# Patient Record
Sex: MALE | Race: WHITE | NOT HISPANIC OR LATINO | Employment: OTHER | URBAN - NONMETROPOLITAN AREA
[De-identification: names, ages, dates, MRNs, and addresses within clinical notes are randomized per-mention and may not be internally consistent; named-entity substitution may affect disease eponyms.]

---

## 2018-09-29 ENCOUNTER — HOSPITAL ENCOUNTER (OUTPATIENT)
Facility: HOSPITAL | Age: 58
Setting detail: OBSERVATION
Discharge: HOME | End: 2018-09-30
Attending: FAMILY MEDICINE | Admitting: EMERGENCY MEDICINE

## 2018-09-29 ENCOUNTER — APPOINTMENT (OUTPATIENT)
Dept: CT IMAGING | Facility: HOSPITAL | Age: 58
End: 2018-09-29

## 2018-09-29 ENCOUNTER — APPOINTMENT (OUTPATIENT)
Dept: GENERAL RADIOLOGY | Facility: HOSPITAL | Age: 58
End: 2018-09-29

## 2018-09-29 DIAGNOSIS — R07.9 CHEST PAIN, UNSPECIFIED TYPE: Primary | ICD-10-CM

## 2018-09-29 LAB
ALBUMIN SERPL-MCNC: 4 G/DL (ref 3.5–5)
ALBUMIN/GLOB SERPL: 1.2 G/DL (ref 1.1–2.5)
ALP SERPL-CCNC: 120 U/L (ref 24–120)
ALT SERPL W P-5'-P-CCNC: 35 U/L (ref 0–54)
ANION GAP SERPL CALCULATED.3IONS-SCNC: 9 MMOL/L (ref 4–13)
AST SERPL-CCNC: 23 U/L (ref 7–45)
BASOPHILS # BLD AUTO: 0.07 10*3/MM3 (ref 0–0.2)
BASOPHILS NFR BLD AUTO: 0.8 % (ref 0–2)
BILIRUB SERPL-MCNC: 0.4 MG/DL (ref 0.1–1)
BUN BLD-MCNC: 10 MG/DL (ref 5–21)
BUN/CREAT SERPL: 10.9 (ref 7–25)
CALCIUM SPEC-SCNC: 8.7 MG/DL (ref 8.4–10.4)
CHLORIDE SERPL-SCNC: 104 MMOL/L (ref 98–110)
CO2 SERPL-SCNC: 28 MMOL/L (ref 24–31)
CREAT BLD-MCNC: 0.92 MG/DL (ref 0.5–1.4)
D DIMER PPP FEU-MCNC: 0.35 MG/L (FEU) (ref 0–0.5)
DEPRECATED RDW RBC AUTO: 42.2 FL (ref 40–54)
EOSINOPHIL # BLD AUTO: 0.62 10*3/MM3 (ref 0–0.7)
EOSINOPHIL NFR BLD AUTO: 7.5 % (ref 0–4)
ERYTHROCYTE [DISTWIDTH] IN BLOOD BY AUTOMATED COUNT: 13.1 % (ref 12–15)
GFR SERPL CREATININE-BSD FRML MDRD: 84 ML/MIN/1.73
GLOBULIN UR ELPH-MCNC: 3.4 GM/DL
GLUCOSE BLD-MCNC: 113 MG/DL (ref 70–100)
HCT VFR BLD AUTO: 42.1 % (ref 40–52)
HGB BLD-MCNC: 14.7 G/DL (ref 14–18)
HOLD SPECIMEN: NORMAL
HOLD SPECIMEN: NORMAL
IMM GRANULOCYTES # BLD: 0.02 10*3/MM3 (ref 0–0.03)
IMM GRANULOCYTES NFR BLD: 0.2 % (ref 0–5)
INR PPP: 0.84 (ref 0.91–1.09)
LYMPHOCYTES # BLD AUTO: 2.27 10*3/MM3 (ref 0.72–4.86)
LYMPHOCYTES NFR BLD AUTO: 27.3 % (ref 15–45)
MCH RBC QN AUTO: 30.6 PG (ref 28–32)
MCHC RBC AUTO-ENTMCNC: 34.9 G/DL (ref 33–36)
MCV RBC AUTO: 87.7 FL (ref 82–95)
MONOCYTES # BLD AUTO: 0.67 10*3/MM3 (ref 0.19–1.3)
MONOCYTES NFR BLD AUTO: 8.1 % (ref 4–12)
NEUTROPHILS # BLD AUTO: 4.67 10*3/MM3 (ref 1.87–8.4)
NEUTROPHILS NFR BLD AUTO: 56.1 % (ref 39–78)
NRBC BLD MANUAL-RTO: 0 /100 WBC (ref 0–0)
PLATELET # BLD AUTO: 370 10*3/MM3 (ref 130–400)
PMV BLD AUTO: 10.2 FL (ref 6–12)
POTASSIUM BLD-SCNC: 3.8 MMOL/L (ref 3.5–5.3)
PROT SERPL-MCNC: 7.4 G/DL (ref 6.3–8.7)
PROTHROMBIN TIME: 11.7 SECONDS (ref 11.9–14.6)
RBC # BLD AUTO: 4.8 10*6/MM3 (ref 4.8–5.9)
SODIUM BLD-SCNC: 141 MMOL/L (ref 135–145)
TROPONIN I SERPL-MCNC: <0.012 NG/ML (ref 0–0.03)
WBC NRBC COR # BLD: 8.32 10*3/MM3 (ref 4.8–10.8)
WHOLE BLOOD HOLD SPECIMEN: NORMAL
WHOLE BLOOD HOLD SPECIMEN: NORMAL

## 2018-09-29 PROCEDURE — 96376 TX/PRO/DX INJ SAME DRUG ADON: CPT

## 2018-09-29 PROCEDURE — 96374 THER/PROPH/DIAG INJ IV PUSH: CPT

## 2018-09-29 PROCEDURE — 93010 ELECTROCARDIOGRAM REPORT: CPT | Performed by: INTERNAL MEDICINE

## 2018-09-29 PROCEDURE — 93005 ELECTROCARDIOGRAM TRACING: CPT | Performed by: FAMILY MEDICINE

## 2018-09-29 PROCEDURE — 25010000002 ONDANSETRON PER 1 MG: Performed by: EMERGENCY MEDICINE

## 2018-09-29 PROCEDURE — 25010000002 MORPHINE PER 10 MG: Performed by: EMERGENCY MEDICINE

## 2018-09-29 PROCEDURE — 80053 COMPREHEN METABOLIC PANEL: CPT

## 2018-09-29 PROCEDURE — 71250 CT THORAX DX C-: CPT

## 2018-09-29 PROCEDURE — 85025 COMPLETE CBC W/AUTO DIFF WBC: CPT

## 2018-09-29 PROCEDURE — 84484 ASSAY OF TROPONIN QUANT: CPT | Performed by: INTERNAL MEDICINE

## 2018-09-29 PROCEDURE — 25010000002 MORPHINE SULFATE (PF) 2 MG/ML SOLUTION: Performed by: INTERNAL MEDICINE

## 2018-09-29 PROCEDURE — 84484 ASSAY OF TROPONIN QUANT: CPT | Performed by: FAMILY MEDICINE

## 2018-09-29 PROCEDURE — 85610 PROTHROMBIN TIME: CPT

## 2018-09-29 PROCEDURE — G0378 HOSPITAL OBSERVATION PER HR: HCPCS

## 2018-09-29 PROCEDURE — 96375 TX/PRO/DX INJ NEW DRUG ADDON: CPT

## 2018-09-29 PROCEDURE — 84484 ASSAY OF TROPONIN QUANT: CPT

## 2018-09-29 PROCEDURE — 93005 ELECTROCARDIOGRAM TRACING: CPT | Performed by: PHYSICIAN ASSISTANT

## 2018-09-29 PROCEDURE — 85379 FIBRIN DEGRADATION QUANT: CPT | Performed by: PHYSICIAN ASSISTANT

## 2018-09-29 PROCEDURE — 96361 HYDRATE IV INFUSION ADD-ON: CPT

## 2018-09-29 PROCEDURE — 99285 EMERGENCY DEPT VISIT HI MDM: CPT

## 2018-09-29 PROCEDURE — 96372 THER/PROPH/DIAG INJ SC/IM: CPT

## 2018-09-29 PROCEDURE — 93005 ELECTROCARDIOGRAM TRACING: CPT

## 2018-09-29 PROCEDURE — 25010000002 ENOXAPARIN PER 10 MG: Performed by: INTERNAL MEDICINE

## 2018-09-29 PROCEDURE — 71045 X-RAY EXAM CHEST 1 VIEW: CPT

## 2018-09-29 RX ORDER — ASPIRIN 81 MG/1
81 TABLET ORAL DAILY
Status: DISCONTINUED | OUTPATIENT
Start: 2018-09-29 | End: 2018-09-30 | Stop reason: HOSPADM

## 2018-09-29 RX ORDER — NICOTINE 21 MG/24HR
1 PATCH, TRANSDERMAL 24 HOURS TRANSDERMAL EVERY 24 HOURS
Status: DISCONTINUED | OUTPATIENT
Start: 2018-09-29 | End: 2018-09-30 | Stop reason: HOSPADM

## 2018-09-29 RX ORDER — ONDANSETRON 2 MG/ML
4 INJECTION INTRAMUSCULAR; INTRAVENOUS ONCE
Status: COMPLETED | OUTPATIENT
Start: 2018-09-29 | End: 2018-09-29

## 2018-09-29 RX ORDER — SODIUM CHLORIDE 0.9 % (FLUSH) 0.9 %
1-10 SYRINGE (ML) INJECTION AS NEEDED
Status: DISCONTINUED | OUTPATIENT
Start: 2018-09-29 | End: 2018-09-30 | Stop reason: HOSPADM

## 2018-09-29 RX ORDER — ACETAMINOPHEN 325 MG/1
650 TABLET ORAL EVERY 4 HOURS PRN
Status: DISCONTINUED | OUTPATIENT
Start: 2018-09-29 | End: 2018-09-30 | Stop reason: HOSPADM

## 2018-09-29 RX ORDER — SODIUM CHLORIDE 9 MG/ML
75 INJECTION, SOLUTION INTRAVENOUS CONTINUOUS
Status: DISCONTINUED | OUTPATIENT
Start: 2018-09-29 | End: 2018-09-30 | Stop reason: HOSPADM

## 2018-09-29 RX ORDER — HYDROCODONE BITARTRATE AND ACETAMINOPHEN 5; 325 MG/1; MG/1
1 TABLET ORAL EVERY 4 HOURS PRN
Status: DISCONTINUED | OUTPATIENT
Start: 2018-09-29 | End: 2018-09-30 | Stop reason: HOSPADM

## 2018-09-29 RX ORDER — MORPHINE SULFATE 2 MG/ML
2 INJECTION, SOLUTION INTRAMUSCULAR; INTRAVENOUS ONCE
Status: COMPLETED | OUTPATIENT
Start: 2018-09-29 | End: 2018-09-29

## 2018-09-29 RX ORDER — MORPHINE SULFATE 2 MG/ML
2 INJECTION, SOLUTION INTRAMUSCULAR; INTRAVENOUS EVERY 4 HOURS PRN
Status: DISCONTINUED | OUTPATIENT
Start: 2018-09-29 | End: 2018-09-30 | Stop reason: HOSPADM

## 2018-09-29 RX ORDER — HYDROCODONE BITARTRATE AND ACETAMINOPHEN 10; 325 MG/1; MG/1
1 TABLET ORAL EVERY 4 HOURS PRN
Status: DISCONTINUED | OUTPATIENT
Start: 2018-09-29 | End: 2018-09-30 | Stop reason: HOSPADM

## 2018-09-29 RX ORDER — ONDANSETRON 2 MG/ML
4 INJECTION INTRAMUSCULAR; INTRAVENOUS EVERY 6 HOURS PRN
Status: DISCONTINUED | OUTPATIENT
Start: 2018-09-29 | End: 2018-09-30 | Stop reason: HOSPADM

## 2018-09-29 RX ORDER — SODIUM CHLORIDE 0.9 % (FLUSH) 0.9 %
10 SYRINGE (ML) INJECTION AS NEEDED
Status: DISCONTINUED | OUTPATIENT
Start: 2018-09-29 | End: 2018-09-30 | Stop reason: HOSPADM

## 2018-09-29 RX ADMIN — MORPHINE SULFATE 2 MG: 2 INJECTION, SOLUTION INTRAMUSCULAR; INTRAVENOUS at 18:45

## 2018-09-29 RX ADMIN — ENOXAPARIN SODIUM 40 MG: 40 INJECTION SUBCUTANEOUS at 18:00

## 2018-09-29 RX ADMIN — ASPIRIN 81 MG: 81 TABLET ORAL at 18:00

## 2018-09-29 RX ADMIN — MORPHINE SULFATE 2 MG: 2 INJECTION, SOLUTION INTRAMUSCULAR; INTRAVENOUS at 21:46

## 2018-09-29 RX ADMIN — ONDANSETRON 4 MG: 2 INJECTION INTRAMUSCULAR; INTRAVENOUS at 13:55

## 2018-09-29 RX ADMIN — MORPHINE SULFATE 4 MG: 4 INJECTION INTRAVENOUS at 13:55

## 2018-09-29 RX ADMIN — NICOTINE 1 PATCH: 21 PATCH, EXTENDED RELEASE TRANSDERMAL at 18:00

## 2018-09-29 RX ADMIN — SODIUM CHLORIDE 75 ML/HR: 9 INJECTION, SOLUTION INTRAVENOUS at 18:00

## 2018-09-30 ENCOUNTER — APPOINTMENT (OUTPATIENT)
Dept: CARDIOLOGY | Facility: HOSPITAL | Age: 58
End: 2018-09-30
Attending: INTERNAL MEDICINE

## 2018-09-30 VITALS
DIASTOLIC BLOOD PRESSURE: 77 MMHG | BODY MASS INDEX: 27.62 KG/M2 | RESPIRATION RATE: 18 BRPM | OXYGEN SATURATION: 95 % | TEMPERATURE: 97.9 F | SYSTOLIC BLOOD PRESSURE: 136 MMHG | HEIGHT: 70 IN | HEART RATE: 64 BPM | WEIGHT: 192.9 LBS

## 2018-09-30 LAB
ANION GAP SERPL CALCULATED.3IONS-SCNC: 7 MMOL/L (ref 4–13)
ARTICHOKE IGE QN: 130 MG/DL (ref 0–99)
BH CV STRESS BP STAGE 1: NORMAL
BH CV STRESS BP STAGE 2: NORMAL
BH CV STRESS BP STAGE 3: NORMAL
BH CV STRESS BP STAGE 4: NORMAL
BH CV STRESS DURATION MIN STAGE 1: 3
BH CV STRESS DURATION MIN STAGE 2: 3
BH CV STRESS DURATION MIN STAGE 3: 3
BH CV STRESS DURATION MIN STAGE 4: 1
BH CV STRESS DURATION SEC STAGE 1: 0
BH CV STRESS DURATION SEC STAGE 2: 0
BH CV STRESS DURATION SEC STAGE 3: 0
BH CV STRESS DURATION SEC STAGE 4: 19
BH CV STRESS GRADE STAGE 1: 10
BH CV STRESS GRADE STAGE 2: 10
BH CV STRESS GRADE STAGE 3: 10
BH CV STRESS GRADE STAGE 4: 12
BH CV STRESS HR STAGE 1: 112
BH CV STRESS HR STAGE 2: 109
BH CV STRESS HR STAGE 3: 125
BH CV STRESS HR STAGE 4: 130
BH CV STRESS METS STAGE 1: 5
BH CV STRESS METS STAGE 2: 5
BH CV STRESS METS STAGE 3: 5
BH CV STRESS METS STAGE 4: 7.5
BH CV STRESS PROTOCOL 1: NORMAL
BH CV STRESS RECOVERY BP: NORMAL MMHG
BH CV STRESS RECOVERY HR: 81 BPM
BH CV STRESS SPEED STAGE 1: 1.7
BH CV STRESS SPEED STAGE 2: 1.7
BH CV STRESS SPEED STAGE 3: 1.7
BH CV STRESS SPEED STAGE 4: 2.5
BH CV STRESS STAGE 1: 1
BH CV STRESS STAGE 2: 2
BH CV STRESS STAGE 3: 3
BH CV STRESS STAGE 4: 4
BUN BLD-MCNC: 11 MG/DL (ref 5–21)
BUN/CREAT SERPL: 12.6 (ref 7–25)
CALCIUM SPEC-SCNC: 8.3 MG/DL (ref 8.4–10.4)
CHLORIDE SERPL-SCNC: 105 MMOL/L (ref 98–110)
CHOLEST SERPL-MCNC: 190 MG/DL (ref 130–200)
CO2 SERPL-SCNC: 27 MMOL/L (ref 24–31)
CREAT BLD-MCNC: 0.87 MG/DL (ref 0.5–1.4)
DEPRECATED RDW RBC AUTO: 42.5 FL (ref 40–54)
ERYTHROCYTE [DISTWIDTH] IN BLOOD BY AUTOMATED COUNT: 13.3 % (ref 12–15)
GFR SERPL CREATININE-BSD FRML MDRD: 90 ML/MIN/1.73
GLUCOSE BLD-MCNC: 101 MG/DL (ref 70–100)
HBA1C MFR BLD: 6.1 %
HCT VFR BLD AUTO: 38.6 % (ref 40–52)
HDLC SERPL-MCNC: 33 MG/DL
HGB BLD-MCNC: 13.4 G/DL (ref 14–18)
LDLC/HDLC SERPL: 3.28 {RATIO}
MAXIMAL PREDICTED HEART RATE: 160 BPM
MCH RBC QN AUTO: 30.2 PG (ref 28–32)
MCHC RBC AUTO-ENTMCNC: 34.7 G/DL (ref 33–36)
MCV RBC AUTO: 87.1 FL (ref 82–95)
PERCENT MAX PREDICTED HR: 81.25 %
PLATELET # BLD AUTO: 346 10*3/MM3 (ref 130–400)
PMV BLD AUTO: 10.5 FL (ref 6–12)
POTASSIUM BLD-SCNC: 4.3 MMOL/L (ref 3.5–5.3)
RBC # BLD AUTO: 4.43 10*6/MM3 (ref 4.8–5.9)
SODIUM BLD-SCNC: 139 MMOL/L (ref 135–145)
STRESS BASELINE BP: NORMAL MMHG
STRESS BASELINE HR: 64 BPM
STRESS PERCENT HR: 96 %
STRESS POST EXERCISE DUR MIN: 10 MIN
STRESS POST EXERCISE DUR SEC: 19 SEC
STRESS POST PEAK BP: NORMAL MMHG
STRESS POST PEAK HR: 130 BPM
STRESS TARGET HR: 136 BPM
TRIGL SERPL-MCNC: 244 MG/DL (ref 0–149)
TROPONIN I SERPL-MCNC: <0.012 NG/ML (ref 0–0.03)
TSH SERPL DL<=0.05 MIU/L-ACNC: 1.01 MIU/ML (ref 0.47–4.68)
WBC NRBC COR # BLD: 9.25 10*3/MM3 (ref 4.8–10.8)

## 2018-09-30 PROCEDURE — 93018 CV STRESS TEST I&R ONLY: CPT | Performed by: INTERNAL MEDICINE

## 2018-09-30 PROCEDURE — 25010000002 MORPHINE SULFATE (PF) 2 MG/ML SOLUTION: Performed by: INTERNAL MEDICINE

## 2018-09-30 PROCEDURE — 96361 HYDRATE IV INFUSION ADD-ON: CPT

## 2018-09-30 PROCEDURE — 93350 STRESS TTE ONLY: CPT

## 2018-09-30 PROCEDURE — 93017 CV STRESS TEST TRACING ONLY: CPT

## 2018-09-30 PROCEDURE — G0378 HOSPITAL OBSERVATION PER HR: HCPCS

## 2018-09-30 PROCEDURE — 25010000002 PERFLUTREN 6.52 MG/ML SUSPENSION: Performed by: INTERNAL MEDICINE

## 2018-09-30 PROCEDURE — 80048 BASIC METABOLIC PNL TOTAL CA: CPT | Performed by: INTERNAL MEDICINE

## 2018-09-30 PROCEDURE — 84443 ASSAY THYROID STIM HORMONE: CPT | Performed by: INTERNAL MEDICINE

## 2018-09-30 PROCEDURE — 85027 COMPLETE CBC AUTOMATED: CPT | Performed by: INTERNAL MEDICINE

## 2018-09-30 PROCEDURE — 93350 STRESS TTE ONLY: CPT | Performed by: INTERNAL MEDICINE

## 2018-09-30 PROCEDURE — 80061 LIPID PANEL: CPT | Performed by: INTERNAL MEDICINE

## 2018-09-30 PROCEDURE — 96376 TX/PRO/DX INJ SAME DRUG ADON: CPT

## 2018-09-30 PROCEDURE — 84484 ASSAY OF TROPONIN QUANT: CPT | Performed by: INTERNAL MEDICINE

## 2018-09-30 PROCEDURE — 93352 ADMIN ECG CONTRAST AGENT: CPT | Performed by: INTERNAL MEDICINE

## 2018-09-30 PROCEDURE — 83036 HEMOGLOBIN GLYCOSYLATED A1C: CPT | Performed by: INTERNAL MEDICINE

## 2018-09-30 RX ORDER — ATORVASTATIN CALCIUM 20 MG/1
20 TABLET, FILM COATED ORAL DAILY
Qty: 30 TABLET | Refills: 0 | Status: SHIPPED | OUTPATIENT
Start: 2018-09-30 | End: 2021-03-19

## 2018-09-30 RX ORDER — METOPROLOL SUCCINATE 25 MG/1
25 TABLET, EXTENDED RELEASE ORAL DAILY
Qty: 30 TABLET | Refills: 0 | Status: SHIPPED | OUTPATIENT
Start: 2018-09-30 | End: 2021-03-19

## 2018-09-30 RX ORDER — ASPIRIN 81 MG/1
81 TABLET, CHEWABLE ORAL DAILY
COMMUNITY
End: 2021-09-08 | Stop reason: HOSPADM

## 2018-09-30 RX ADMIN — HYDROCODONE BITARTRATE AND ACETAMINOPHEN 1 TABLET: 10; 325 TABLET ORAL at 05:55

## 2018-09-30 RX ADMIN — MORPHINE SULFATE 2 MG: 2 INJECTION, SOLUTION INTRAMUSCULAR; INTRAVENOUS at 08:33

## 2018-09-30 RX ADMIN — SODIUM CHLORIDE 75 ML/HR: 9 INJECTION, SOLUTION INTRAVENOUS at 05:55

## 2018-09-30 RX ADMIN — PERFLUTREN 8.48 MG: 6.52 INJECTION, SUSPENSION INTRAVENOUS at 09:26

## 2018-09-30 RX ADMIN — MORPHINE SULFATE 2 MG: 2 INJECTION, SOLUTION INTRAMUSCULAR; INTRAVENOUS at 03:21

## 2018-09-30 RX ADMIN — PERFLUTREN 8.48 MG: 6.52 INJECTION, SUSPENSION INTRAVENOUS at 10:20

## 2018-10-04 PROBLEM — R07.9 CHEST PAIN: Status: RESOLVED | Noted: 2018-09-29 | Resolved: 2018-10-04

## 2018-10-04 PROBLEM — I25.119 CORONARY ARTERY DISEASE INVOLVING NATIVE CORONARY ARTERY OF NATIVE HEART WITH ANGINA PECTORIS (HCC): Status: ACTIVE | Noted: 2018-10-04

## 2018-10-04 PROBLEM — Z72.0 TOBACCO ABUSE: Status: ACTIVE | Noted: 2018-10-04

## 2020-06-07 ENCOUNTER — HOSPITAL ENCOUNTER (EMERGENCY)
Facility: HOSPITAL | Age: 60
Discharge: HOME | End: 2020-06-07
Attending: EMERGENCY MEDICINE | Admitting: EMERGENCY MEDICINE

## 2020-06-07 ENCOUNTER — APPOINTMENT (OUTPATIENT)
Dept: GENERAL RADIOLOGY | Facility: HOSPITAL | Age: 60
End: 2020-06-07

## 2020-06-07 VITALS
HEIGHT: 70 IN | TEMPERATURE: 97.3 F | HEART RATE: 72 BPM | DIASTOLIC BLOOD PRESSURE: 89 MMHG | SYSTOLIC BLOOD PRESSURE: 148 MMHG | WEIGHT: 205 LBS | OXYGEN SATURATION: 99 % | RESPIRATION RATE: 14 BRPM | BODY MASS INDEX: 29.35 KG/M2

## 2020-06-07 DIAGNOSIS — R07.89 CHEST WALL PAIN: Primary | ICD-10-CM

## 2020-06-07 DIAGNOSIS — R09.1 PLEURISY: ICD-10-CM

## 2020-06-07 LAB
ALBUMIN SERPL-MCNC: 4.5 G/DL (ref 3.5–5.2)
ALBUMIN/GLOB SERPL: 1.7 G/DL
ALP SERPL-CCNC: 135 U/L (ref 39–117)
ALT SERPL W P-5'-P-CCNC: 20 U/L (ref 1–41)
ANION GAP SERPL CALCULATED.3IONS-SCNC: 12 MMOL/L (ref 5–15)
ARTERIAL PATENCY WRIST A: POSITIVE
AST SERPL-CCNC: 18 U/L (ref 1–40)
ATMOSPHERIC PRESS: 750 MMHG
BASE EXCESS BLDA CALC-SCNC: 2.3 MMOL/L (ref 0–2)
BASOPHILS # BLD AUTO: 0.08 10*3/MM3 (ref 0–0.2)
BASOPHILS NFR BLD AUTO: 1.2 % (ref 0–1.5)
BDY SITE: ABNORMAL
BILIRUB SERPL-MCNC: 0.4 MG/DL (ref 0.2–1.2)
BODY TEMPERATURE: 37 C
BUN BLD-MCNC: 14 MG/DL (ref 8–23)
BUN/CREAT SERPL: 13.9 (ref 7–25)
CALCIUM SPEC-SCNC: 9.2 MG/DL (ref 8.6–10.5)
CHLORIDE SERPL-SCNC: 101 MMOL/L (ref 98–107)
CO2 SERPL-SCNC: 25 MMOL/L (ref 22–29)
CREAT BLD-MCNC: 1.01 MG/DL (ref 0.76–1.27)
D DIMER PPP FEU-MCNC: 0.39 MG/L (FEU) (ref 0–0.5)
DEPRECATED RDW RBC AUTO: 46.4 FL (ref 37–54)
EOSINOPHIL # BLD AUTO: 0.29 10*3/MM3 (ref 0–0.4)
EOSINOPHIL NFR BLD AUTO: 4.3 % (ref 0.3–6.2)
ERYTHROCYTE [DISTWIDTH] IN BLOOD BY AUTOMATED COUNT: 14.3 % (ref 12.3–15.4)
GFR SERPL CREATININE-BSD FRML MDRD: 75 ML/MIN/1.73
GLOBULIN UR ELPH-MCNC: 2.6 GM/DL
GLUCOSE BLD-MCNC: 127 MG/DL (ref 65–99)
HCO3 BLDA-SCNC: 27.7 MMOL/L (ref 20–26)
HCT VFR BLD AUTO: 45.1 % (ref 37.5–51)
HGB BLD-MCNC: 15.4 G/DL (ref 13–17.7)
HOLD SPECIMEN: NORMAL
HOLD SPECIMEN: NORMAL
IMM GRANULOCYTES # BLD AUTO: 0.03 10*3/MM3 (ref 0–0.05)
IMM GRANULOCYTES NFR BLD AUTO: 0.4 % (ref 0–0.5)
LYMPHOCYTES # BLD AUTO: 1.68 10*3/MM3 (ref 0.7–3.1)
LYMPHOCYTES NFR BLD AUTO: 24.7 % (ref 19.6–45.3)
Lab: ABNORMAL
MCH RBC QN AUTO: 29.9 PG (ref 26.6–33)
MCHC RBC AUTO-ENTMCNC: 34.1 G/DL (ref 31.5–35.7)
MCV RBC AUTO: 87.6 FL (ref 79–97)
MODALITY: ABNORMAL
MONOCYTES # BLD AUTO: 0.56 10*3/MM3 (ref 0.1–0.9)
MONOCYTES NFR BLD AUTO: 8.2 % (ref 5–12)
NEUTROPHILS # BLD AUTO: 4.17 10*3/MM3 (ref 1.7–7)
NEUTROPHILS NFR BLD AUTO: 61.2 % (ref 42.7–76)
NRBC BLD AUTO-RTO: 0 /100 WBC (ref 0–0.2)
NT-PROBNP SERPL-MCNC: 36.2 PG/ML (ref 5–900)
PCO2 BLDA: 44.5 MM HG (ref 35–45)
PH BLDA: 7.4 PH UNITS (ref 7.35–7.45)
PLATELET # BLD AUTO: 289 10*3/MM3 (ref 140–450)
PMV BLD AUTO: 10.6 FL (ref 6–12)
PO2 BLDA: 76.6 MM HG (ref 83–108)
POTASSIUM BLD-SCNC: 5.1 MMOL/L (ref 3.5–5.2)
PROT SERPL-MCNC: 7.1 G/DL (ref 6–8.5)
RBC # BLD AUTO: 5.15 10*6/MM3 (ref 4.14–5.8)
SAO2 % BLDCOA: 96.4 % (ref 94–99)
SODIUM BLD-SCNC: 138 MMOL/L (ref 136–145)
TROPONIN T SERPL-MCNC: <0.01 NG/ML (ref 0–0.03)
VENTILATOR MODE: ABNORMAL
WBC NRBC COR # BLD: 6.81 10*3/MM3 (ref 3.4–10.8)
WHOLE BLOOD HOLD SPECIMEN: NORMAL
WHOLE BLOOD HOLD SPECIMEN: NORMAL

## 2020-06-07 PROCEDURE — 94799 UNLISTED PULMONARY SVC/PX: CPT

## 2020-06-07 PROCEDURE — 36600 WITHDRAWAL OF ARTERIAL BLOOD: CPT

## 2020-06-07 PROCEDURE — 25010000002 ONDANSETRON PER 1 MG: Performed by: EMERGENCY MEDICINE

## 2020-06-07 PROCEDURE — 94640 AIRWAY INHALATION TREATMENT: CPT

## 2020-06-07 PROCEDURE — 85025 COMPLETE CBC W/AUTO DIFF WBC: CPT | Performed by: EMERGENCY MEDICINE

## 2020-06-07 PROCEDURE — 25010000002 DEXAMETHASONE PER 1 MG: Performed by: EMERGENCY MEDICINE

## 2020-06-07 PROCEDURE — 96374 THER/PROPH/DIAG INJ IV PUSH: CPT

## 2020-06-07 PROCEDURE — 99284 EMERGENCY DEPT VISIT MOD MDM: CPT

## 2020-06-07 PROCEDURE — 96375 TX/PRO/DX INJ NEW DRUG ADDON: CPT

## 2020-06-07 PROCEDURE — 25010000002 MORPHINE PER 10 MG: Performed by: EMERGENCY MEDICINE

## 2020-06-07 PROCEDURE — 87040 BLOOD CULTURE FOR BACTERIA: CPT | Performed by: EMERGENCY MEDICINE

## 2020-06-07 PROCEDURE — 83880 ASSAY OF NATRIURETIC PEPTIDE: CPT | Performed by: EMERGENCY MEDICINE

## 2020-06-07 PROCEDURE — 80053 COMPREHEN METABOLIC PANEL: CPT | Performed by: EMERGENCY MEDICINE

## 2020-06-07 PROCEDURE — 93010 ELECTROCARDIOGRAM REPORT: CPT | Performed by: INTERNAL MEDICINE

## 2020-06-07 PROCEDURE — 82803 BLOOD GASES ANY COMBINATION: CPT

## 2020-06-07 PROCEDURE — 71045 X-RAY EXAM CHEST 1 VIEW: CPT

## 2020-06-07 PROCEDURE — 85379 FIBRIN DEGRADATION QUANT: CPT | Performed by: EMERGENCY MEDICINE

## 2020-06-07 PROCEDURE — 84484 ASSAY OF TROPONIN QUANT: CPT | Performed by: EMERGENCY MEDICINE

## 2020-06-07 PROCEDURE — 93005 ELECTROCARDIOGRAM TRACING: CPT | Performed by: EMERGENCY MEDICINE

## 2020-06-07 RX ORDER — IPRATROPIUM BROMIDE AND ALBUTEROL SULFATE 2.5; .5 MG/3ML; MG/3ML
3 SOLUTION RESPIRATORY (INHALATION) ONCE
Status: COMPLETED | OUTPATIENT
Start: 2020-06-07 | End: 2020-06-07

## 2020-06-07 RX ORDER — AZITHROMYCIN 250 MG/1
TABLET, FILM COATED ORAL
Qty: 6 TABLET | Refills: 0 | Status: SHIPPED | OUTPATIENT
Start: 2020-06-07 | End: 2021-03-19

## 2020-06-07 RX ORDER — ONDANSETRON 2 MG/ML
4 INJECTION INTRAMUSCULAR; INTRAVENOUS ONCE
Status: COMPLETED | OUTPATIENT
Start: 2020-06-07 | End: 2020-06-07

## 2020-06-07 RX ORDER — METHYLPREDNISOLONE 4 MG/1
TABLET ORAL
Qty: 21 TABLET | Refills: 0 | Status: SHIPPED | OUTPATIENT
Start: 2020-06-07 | End: 2021-03-19

## 2020-06-07 RX ORDER — DEXAMETHASONE SODIUM PHOSPHATE 10 MG/ML
10 INJECTION INTRAMUSCULAR; INTRAVENOUS ONCE
Status: COMPLETED | OUTPATIENT
Start: 2020-06-07 | End: 2020-06-07

## 2020-06-07 RX ADMIN — ONDANSETRON HYDROCHLORIDE 4 MG: 2 SOLUTION INTRAMUSCULAR; INTRAVENOUS at 11:00

## 2020-06-07 RX ADMIN — DEXAMETHASONE SODIUM PHOSPHATE 10 MG: 10 INJECTION INTRAMUSCULAR; INTRAVENOUS at 12:01

## 2020-06-07 RX ADMIN — MORPHINE SULFATE 4 MG: 4 INJECTION, SOLUTION INTRAMUSCULAR; INTRAVENOUS at 11:01

## 2020-06-07 RX ADMIN — IPRATROPIUM BROMIDE AND ALBUTEROL SULFATE 3 ML: 2.5; .5 SOLUTION RESPIRATORY (INHALATION) at 12:05

## 2020-06-07 NOTE — ED PROVIDER NOTES
Subjective   Patient with complaint of shortness of breath and chest wall pain which started about 2 to 3 days ago progressive present no other complaint associate with this nausea vomiting the pain is reproducible and gets worse taking deep breath there is no chest pressure      Chest Pain   Pain location:  L chest  Pain quality: sharp and stabbing    Pain radiates to:  Does not radiate  Pain severity:  Moderate  Onset quality:  Gradual  Timing:  Constant  Progression:  Worsening  Chronicity:  New  Context: breathing    Context: not drug use, not eating, not lifting, not movement and not at rest    Relieved by:  Nothing  Worsened by:  Nothing  Ineffective treatments:  None tried  Associated symptoms: shortness of breath    Associated symptoms: no abdominal pain, no AICD problem, no anorexia, no anxiety, no back pain, no claudication, no cough, no dysphagia, no fatigue, no fever, no headache, no heartburn, no nausea, no near-syncope, no numbness and no weakness    Risk factors: coronary artery disease, male sex and smoking    Risk factors: no aortic disease, no birth control and no hypertension    Shortness of Breath   Severity:  Moderate  Onset quality:  Gradual  Timing:  Constant  Progression:  Worsening  Chronicity:  New  Context: activity    Context: not animal exposure, not emotional upset and not pollens    Relieved by:  Nothing  Worsened by:  Deep breathing  Ineffective treatments:  None tried  Associated symptoms: chest pain    Associated symptoms: no abdominal pain, no claudication, no cough, no fever, no headaches and no neck pain    Risk factors: no recent alcohol use, no hx of PE/DVT, no obesity, no prolonged immobilization and no recent surgery        Review of Systems   Constitutional: Negative.  Negative for fatigue and fever.   HENT: Negative.  Negative for trouble swallowing.    Respiratory: Positive for shortness of breath. Negative for cough.    Cardiovascular: Positive for chest pain. Negative  for claudication and near-syncope.   Gastrointestinal: Negative.  Negative for abdominal distention, abdominal pain, anorexia, heartburn and nausea.   Endocrine: Negative.    Genitourinary: Negative.    Musculoskeletal: Negative.  Negative for back pain and neck pain.   Skin: Negative for color change and pallor.   Neurological: Negative.  Negative for syncope, weakness, light-headedness, numbness and headaches.   Hematological: Negative.  Does not bruise/bleed easily.   All other systems reviewed and are negative.      Past Medical History:   Diagnosis Date   • Coronary artery disease    • Degenerative arthritis    • Hx of heart artery stent        Allergies   Allergen Reactions   • Contrast Dye Hives   • Levaquin [Levofloxacin] Nausea Only, Swelling, Dizziness and Angioedema       Past Surgical History:   Procedure Laterality Date   • APPENDECTOMY     • CARDIAC CATHETERIZATION     • KNEE ARTHROPLASTY Right    • PERIPHERAL ARTERIAL STENT GRAFT     • TOTAL HIP ARTHROPLASTY Right        Family History   Problem Relation Age of Onset   • Diabetes Mother    • Heart disease Mother    • Heart disease Father        Social History     Socioeconomic History   • Marital status:      Spouse name: Not on file   • Number of children: Not on file   • Years of education: Not on file   • Highest education level: Not on file   Tobacco Use   • Smoking status: Current Every Day Smoker     Packs/day: 2.00     Years: 10.00     Pack years: 20.00   • Smokeless tobacco: Never Used   Substance and Sexual Activity   • Alcohol use: Never     Frequency: Never   • Drug use: No           Objective   Physical Exam   Constitutional: He is oriented to person, place, and time. He appears well-developed.  Non-toxic appearance. He appears ill. He appears distressed.   HENT:   Head: Normocephalic and atraumatic.   Mouth/Throat: Uvula is midline and mucous membranes are normal.   Eyes: Pupils are equal, round, and reactive to light. Conjunctivae  and lids are normal. Lids are everted and swept, no foreign bodies found.   Neck: Trachea normal, normal range of motion, full passive range of motion without pain and phonation normal. Neck supple. Normal carotid pulses and no JVD present. Carotid bruit is not present. No neck rigidity. No tracheal deviation present.   Cardiovascular: Normal rate, regular rhythm, normal heart sounds, intact distal pulses and normal pulses. PMI is not displaced.   Pulmonary/Chest: Effort normal. No accessory muscle usage or stridor. No apnea and no tachypnea. He has decreased breath sounds in the left middle field and the left lower field. He has wheezes in the right lower field and the left lower field. He has rhonchi in the right lower field and the left lower field. He has no rales. He exhibits tenderness. He exhibits no crepitus.   Abdominal: Soft. Normal appearance, normal aorta and bowel sounds are normal. There is no hepatosplenomegaly. There is no tenderness.   Musculoskeletal: Normal range of motion.   Lower extremity exam bilaterally is unremarkable.  There is no right or left calf tenderness .  There is no palpable venous cord.  No obvious difference in the size of the legs.  No pitting edema.  The dorsalis pedis and posterior tibial femoral and popliteal pulses are palpable and +2 bilaterally.  Homans sign is negative   Neurological: He is alert and oriented to person, place, and time. He has normal strength and normal reflexes. He displays normal reflexes. No cranial nerve deficit or sensory deficit. Gait normal. GCS eye subscore is 4. GCS verbal subscore is 5. GCS motor subscore is 6.   Skin: Skin is warm, dry and intact. No cyanosis. No pallor. Nails show no clubbing.   Psychiatric: He has a normal mood and affect. His speech is normal and behavior is normal.   Nursing note and vitals reviewed.      Procedures           ED Course  ED Course as of Jun 07 1348   Sun Jun 07, 2020   1344 Case discussed at length with the  patient has work-up is negative his Wells score is 0 and his cardiac markers are negative chest x-ray is negative he is having this pleuritic pain in the left side of the chest which is reproducible there is no rash associated with this at this time I have offered him to stay in the hospital and get a VQ scan and stress test but he does not want to do that he wants to go home the possibility of increased morbidity and mortality has been explained to him he understands that.  I will discharge him home on steroids antibiotics and see how he does he has been advised and encouraged to return the ER for any worsening symptoms.    [TS]   1345 Risks and benefits of treatments given and alternative treatment options discussed with patient/family. I answered all the questions in simple, plain language, and there was voiced understanding and agreement with plan of care. There were no further questions. Differential diagnosis discussed. Patient/family was advised that the practice of medicine is not always an exact science, and sometimes tests, physical exam, or history may not show the underlying conditions with certainty. Additionally, the condition may change or show itself later after initial presentation. There was also expressed understanding and agreement with this limitation of emergency medicine practice. Patient/family was asked to return to ED if any problem or issues or if condition worsens or does not improved. Patient/family agreed to follow up with PCP/specialist as advised, or return to ED if unable to see a provider in a timely fashion for continued symptoms.     [TS]   1346 I have told the patient that we cannot rule out a cardiac etiology with a single cardiac enzymes and EKG in the sense but wants to go home    [TS]   1348 His EKG shows normal sinus rhythm with nonspecific ST-T wave changes and he acute    [TS]      ED Course User Index  [TS] Lloyd Garcia MD                                         HEART  Score (for prediction of 6-week risk of major adverse cardiac event) reviewed and/or performed as part of the patient evaluation and treatment planning process.  The result associated with this review/performance is: 2    Wells' Criteria (for pulmonary embolism) reviewed and/or performed as part of the patient evaluation and treatment planning process.  The result associated with this review/performance is: 0       MDM  Number of Diagnoses or Management Options  Diagnosis management comments: Differential Diagnosis:  I considered pulmonary etiology, asthma, chronic obstructive pulmonary disease, pneumonia, pulmonary embolism, adult respiratory distress syndrome, pneumothorax, pleural effusion, pulmonary fibrosis, cardiac etiology, congestive heart failure, myocardial infarction, metabolic etiology, diabetic ketoacidosis, uremia, acidosis, sepsis, anemia, drug related etiology, hyperventilation and CNS disease as a possible cause of dyspnea in this patient. This is a partial list of diagnoses considered.            Amount and/or Complexity of Data Reviewed  Clinical lab tests: reviewed and ordered  Tests in the radiology section of CPT®: ordered and reviewed  Tests in the medicine section of CPT®: ordered and reviewed    Risk of Complications, Morbidity, and/or Mortality  Presenting problems: moderate  Diagnostic procedures: moderate  Management options: moderate        Final diagnoses:   Chest wall pain   Pleurisy            Lloyd Garcia MD  06/07/20 1327       Lloyd Garcia MD  06/07/20 1342

## 2020-06-07 NOTE — DISCHARGE INSTRUCTIONS
Follow up with one of the [facility] physician groups below to setup primary care. If you have trouble making an appointment, please call the [facility] Nurse Line at [phone]    Dr. Sravanthi Oliveros DO, Dr. Maty Cheema DO, and IRMA Garsia  [facility]  [address] [place] [address], [id number] [phone]    Dr. Yaw Pal MD  [facility] - [address] [address], [address], [place], [place] [id number] [phone]    Dr. Azam Bermeo DO, Dr. Blue Lainez DO,  IRMA Laboy, and IRMA Calderon  [facility] - [address] [address], [address], [place], [place] [id number] [phone]     Dr. Maria Tineo MD, and IRMA Burroughs  [facility] [address] [id number] [phone]    Dr. Mono Gunderson MD and Dr. Bobby Stern MD  [address], [address] [phone]    Dr. Dejan Gould MD  [facility]  [address] [place] [address], [address], [place], [place], [address] [phone]    Dr. Peng Garcia MD  [facility]  [address], [place], [address] [phone]                Chest Wall Pain  Chest wall pain is pain in or around the bones and muscles of your chest. Chest wall pain may be caused by:  · An injury.  · Coughing a lot.  · Using your chest and arm muscles too much.  Sometimes, the cause may not be known. This pain may take a few weeks or longer to get better.  Follow these instructions at home:  Managing pain, stiffness, and swelling  If told, put ice on the painful area:  · Put ice in a plastic bag.  · Place a towel between your skin and the bag.  · Leave the ice on for 20 minutes, 2-3 times a  day.    Activity  · Rest as told by your doctor.  · Avoid doing things that cause pain. This includes lifting heavy items.  · Ask your doctor what activities are safe for you.  General instructions    · Take over-the-counter and prescription medicines only as told by your doctor.  · Do not use any products that contain nicotine or tobacco, such as cigarettes, e-cigarettes, and chewing tobacco. If you need help quitting, ask your doctor.  · Keep all follow-up visits as told by your doctor. This is important.  Contact a doctor if:  · You have a fever.  · Your chest pain gets worse.  · You have new symptoms.  Get help right away if:  · You feel sick to your stomach (nauseous) or you throw up (vomit).  · You feel sweaty or light-headed.  · You have a cough with mucus from your lungs (sputum) or you cough up blood.  · You are short of breath.  These symptoms may be an emergency. Do not wait to see if the symptoms will go away. Get medical help right away. Call your local emergency services (911 in the [place] Do not drive yourself to the hospital.  Summary  · Chest wall pain is pain in or around the bones and muscles of your chest.  · It may be treated with ice, rest, and medicines. Your condition may also get better if you avoid doing things that cause pain.  · Contact a doctor if you have a fever, chest pain that gets worse, or new symptoms.  · Get help right away if you feel light-headed or you get short of breath. These symptoms may be an emergency.  This information is not intended to replace advice given to you by your health care provider. Make sure you discuss any questions you have with your health care provider.  Document Released: 06/05/2009 Document Revised: 06/20/2019 Document Reviewed: 06/20/2019  [employer] Patient Education © 2020 [employer]      Nonspecific Chest Pain  Chest pain can be caused by many different conditions. Some causes of chest pain can be life-threatening. These will require treatment  right away. Serious causes of chest pain include:  · Heart attack.  · A tear in the body's main blood vessel.  · Redness and swelling (inflammation) around your heart.  · Blood clot in your lungs.  Other causes of chest pain may not be so serious. These include:  · Heartburn.  · Anxiety or stress.  · Damage to bones or muscles in your chest.  · Lung infections.  Chest pain can feel like:  · Pain or discomfort in your chest.  · Crushing, pressure, aching, or squeezing pain.  · Burning or tingling.  · Dull or sharp pain that is worse when you move, cough, or take a deep breath.  · Pain or discomfort that is also felt in your back, neck, jaw, shoulder, or arm, or pain that spreads to any of these areas.  It is hard to know whether your pain is caused by something that is serious or something that is not so serious. So it is important to see your doctor right away if you have chest pain.  Follow these instructions at home:  Medicines  · Take over-the-counter and prescription medicines only as told by your doctor.  · If you were prescribed an antibiotic medicine, take it as told by your doctor. Do not stop taking the antibiotic even if you start to feel better.  Lifestyle    · Rest as told by your doctor.  · Do not use any products that contain nicotine or tobacco, such as cigarettes, e-cigarettes, and chewing tobacco. If you need help quitting, ask your doctor.  · Do not drink alcohol.  · Make lifestyle changes as told by your doctor. These may include:  ? Getting regular exercise. Ask your doctor what activities are safe for you.  ? Eating a heart-healthy diet. A diet and nutrition specialist (dietitian) can help you to learn healthy eating options.  ? Staying at a healthy weight.  ? Treating diabetes or high blood pressure, if needed.  ? Lowering your stress. Activities such as yoga and relaxation techniques can help.  General instructions  · Pay attention to any changes in your symptoms. Tell your doctor about them  or any new symptoms.  · Avoid any activities that cause chest pain.  · Keep all follow-up visits as told by your doctor. This is important. You may need more testing if your chest pain does not go away.  Contact a doctor if:  · Your chest pain does not go away.  · You feel depressed.  · You have a fever.  Get help right away if:  · Your chest pain is worse.  · You have a cough that gets worse, or you cough up blood.  · You have very bad (severe) pain in your belly (abdomen).  · You pass out (faint).  · You have either of these for no clear reason:  ? Sudden chest discomfort.  ? Sudden discomfort in your arms, back, neck, or jaw.  · You have shortness of breath at any time.  · You suddenly start to sweat, or your skin gets clammy.  · You feel sick to your stomach (nauseous).  · You throw up (vomit).  · You suddenly feel lightheaded or dizzy.  · You feel very weak or tired.  · Your heart starts to beat fast, or it feels like it is skipping beats.  These symptoms may be an emergency. Do not wait to see if the symptoms will go away. Get medical help right away. Call your local emergency services (911 in the [place] Do not drive yourself to the hospital.  Summary  · Chest pain can be caused by many different conditions. The cause may be serious and need treatment right away. If you have chest pain, see your doctor right away.  · Follow your doctor's instructions for taking medicines and making lifestyle changes.  · Keep all follow-up visits as told by your doctor. This includes visits for any further testing if your chest pain does not go away.  · Be sure to know the signs that show that your condition has become worse. Get help right away if you have these symptoms.  This information is not intended to replace advice given to you by your health care provider. Make sure you discuss any questions you have with your health care provider.  Document Released: 06/05/2009 Document Revised: 06/20/2019 Document Reviewed:  06/20/2019  [employer] Patient Education © 2020 [employer]      Pleurisy  Pleurisy is irritation and swelling (inflammation) of the linings of your lungs (pleura). This can cause pain in your chest, back, or shoulder. It can also cause trouble breathing.  Follow these instructions at home:  Medicines  · Take over-the-counter and prescription medicines only as told by your doctor.  · If you were prescribed antibiotic medicine, take it as told by your doctor. Do not stop taking the antibiotic even if you start to feel better.  Activity  · Rest and return to your normal activities as told by your doctor. Ask your doctor what activities are safe for you.  · Do not drive or use heavy machinery while taking prescription pain medicine.  General instructions    · Watch for any changes in your condition.  · Take deep breaths often, even if it is painful. This can help prevent lung problems.  · When lying down, lie on your painful side. This may help you feel less pain.  · Do not smoke. If you need help quitting, ask your doctor.  · Keep all follow-up visits as told by your doctor. This is important.  Contact a doctor if:  · You have pain that:  ? Gets worse.  ? Does not get better with medicine.  ? Lasts for more than 1 week.  · You have a fever or chills.  · You have a cough that does not get better at home.  · You have trouble breathing that does not get better at home.  · You cough up liquid that looks like pus (purulent secretions).  Get help right away if:  · Your lips, fingernails, or toenails turn dark or turn blue.  · You cough up blood.  · You have trouble breathing that gets worse.  · You are making loud noises when you breathe (wheezing) and this gets worse.  · You have pain that spreads to your neck, arms, or jaw.  · You get a rash.  · You throw up (vomit).  · You pass out (faint).  Summary  · Pleurisy is irritation and swelling (inflammation) of the linings of your lungs (pleura).  · Pleurisy can cause pain and  trouble breathing.  · If you have a cough that does not get better at home, contact your doctor.  · Get help right away if you are having trouble breathing and it is getting worse.  This information is not intended to replace advice given to you by your health care provider. Make sure you discuss any questions you have with your health care provider.  Document Released: 11/30/2009 Document Revised: 11/30/2018 Document Reviewed: 09/11/2017  [employer] Patient Education © 2020 [employer]

## 2020-06-08 ENCOUNTER — TELEPHONE (OUTPATIENT)
Dept: INTERNAL MEDICINE | Facility: CLINIC | Age: 60
End: 2020-06-08

## 2020-06-08 NOTE — TELEPHONE ENCOUNTER
Tried calling patient to schedule appt. Patient did not have voicemail box set up. Patient was seen in the ER on 06/07/2020

## 2020-06-12 LAB
BACTERIA SPEC AEROBE CULT: NORMAL
BACTERIA SPEC AEROBE CULT: NORMAL

## 2021-03-16 ENCOUNTER — APPOINTMENT (OUTPATIENT)
Dept: GENERAL RADIOLOGY | Facility: HOSPITAL | Age: 61
End: 2021-03-16

## 2021-03-16 ENCOUNTER — HOSPITAL ENCOUNTER (EMERGENCY)
Facility: HOSPITAL | Age: 61
Discharge: LEFT BEFORE DISCHARGE | End: 2021-03-16
Admitting: INTERNAL MEDICINE

## 2021-03-16 VITALS
TEMPERATURE: 98.3 F | WEIGHT: 209 LBS | BODY MASS INDEX: 30.96 KG/M2 | DIASTOLIC BLOOD PRESSURE: 92 MMHG | HEART RATE: 62 BPM | RESPIRATION RATE: 20 BRPM | SYSTOLIC BLOOD PRESSURE: 132 MMHG | OXYGEN SATURATION: 98 % | HEIGHT: 69 IN

## 2021-03-16 DIAGNOSIS — R07.9 CHEST PAIN, UNSPECIFIED TYPE: Primary | ICD-10-CM

## 2021-03-16 LAB
ANISOCYTOSIS BLD QL: ABNORMAL
BASOPHILS # BLD MANUAL: 0.46 10*3/MM3 (ref 0–0.2)
BASOPHILS NFR BLD AUTO: 5.9 % (ref 0–1.5)
D DIMER PPP FEU-MCNC: 0.9 MG/L (FEU) (ref 0–0.5)
DACRYOCYTES BLD QL SMEAR: ABNORMAL
DEPRECATED RDW RBC AUTO: 43.3 FL (ref 37–54)
EOSINOPHIL # BLD MANUAL: 0.94 10*3/MM3 (ref 0–0.4)
EOSINOPHIL NFR BLD MANUAL: 11.9 % (ref 0.3–6.2)
ERYTHROCYTE [DISTWIDTH] IN BLOOD BY AUTOMATED COUNT: 13.3 % (ref 12.3–15.4)
HCT VFR BLD AUTO: 41.8 % (ref 37.5–51)
HGB BLD-MCNC: 14.1 G/DL (ref 13–17.7)
HOLD SPECIMEN: NORMAL
INR PPP: 0.89 (ref 0.91–1.09)
LYMPHOCYTES # BLD MANUAL: 1.64 10*3/MM3 (ref 0.7–3.1)
LYMPHOCYTES NFR BLD MANUAL: 20.8 % (ref 19.6–45.3)
LYMPHOCYTES NFR BLD MANUAL: 4 % (ref 5–12)
MCH RBC QN AUTO: 29.7 PG (ref 26.6–33)
MCHC RBC AUTO-ENTMCNC: 33.7 G/DL (ref 31.5–35.7)
MCV RBC AUTO: 88 FL (ref 79–97)
MONOCYTES # BLD AUTO: 0.32 10*3/MM3 (ref 0.1–0.9)
NEUTROPHILS # BLD AUTO: 4.22 10*3/MM3 (ref 1.7–7)
NEUTROPHILS NFR BLD MANUAL: 53.5 % (ref 42.7–76)
NT-PROBNP SERPL-MCNC: 60.3 PG/ML (ref 0–900)
PLAT MORPH BLD: NORMAL
PLATELET # BLD AUTO: 357 10*3/MM3 (ref 140–450)
PMV BLD AUTO: 10.5 FL (ref 6–12)
POIKILOCYTOSIS BLD QL SMEAR: ABNORMAL
POLYCHROMASIA BLD QL SMEAR: ABNORMAL
PROTHROMBIN TIME: 11.6 SECONDS (ref 11.9–14.6)
RBC # BLD AUTO: 4.75 10*6/MM3 (ref 4.14–5.8)
SARS-COV-2 RNA PNL SPEC NAA+PROBE: NOT DETECTED
STOMATOCYTES BLD QL SMEAR: ABNORMAL
VARIANT LYMPHS NFR BLD MANUAL: 4 % (ref 0–5)
WBC # BLD AUTO: 7.88 10*3/MM3 (ref 3.4–10.8)
WBC MORPH BLD: NORMAL

## 2021-03-16 PROCEDURE — 85007 BL SMEAR W/DIFF WBC COUNT: CPT | Performed by: NURSE PRACTITIONER

## 2021-03-16 PROCEDURE — C9803 HOPD COVID-19 SPEC COLLECT: HCPCS | Performed by: NURSE PRACTITIONER

## 2021-03-16 PROCEDURE — 93005 ELECTROCARDIOGRAM TRACING: CPT | Performed by: EMERGENCY MEDICINE

## 2021-03-16 PROCEDURE — 85610 PROTHROMBIN TIME: CPT | Performed by: NURSE PRACTITIONER

## 2021-03-16 PROCEDURE — 99284 EMERGENCY DEPT VISIT MOD MDM: CPT

## 2021-03-16 PROCEDURE — 85025 COMPLETE CBC W/AUTO DIFF WBC: CPT | Performed by: NURSE PRACTITIONER

## 2021-03-16 PROCEDURE — 96375 TX/PRO/DX INJ NEW DRUG ADDON: CPT

## 2021-03-16 PROCEDURE — 85379 FIBRIN DEGRADATION QUANT: CPT | Performed by: NURSE PRACTITIONER

## 2021-03-16 PROCEDURE — 83880 ASSAY OF NATRIURETIC PEPTIDE: CPT | Performed by: NURSE PRACTITIONER

## 2021-03-16 PROCEDURE — 87635 SARS-COV-2 COVID-19 AMP PRB: CPT | Performed by: NURSE PRACTITIONER

## 2021-03-16 PROCEDURE — 93010 ELECTROCARDIOGRAM REPORT: CPT | Performed by: INTERNAL MEDICINE

## 2021-03-16 PROCEDURE — 25010000002 MORPHINE PER 10 MG: Performed by: NURSE PRACTITIONER

## 2021-03-16 PROCEDURE — 96374 THER/PROPH/DIAG INJ IV PUSH: CPT

## 2021-03-16 PROCEDURE — 71045 X-RAY EXAM CHEST 1 VIEW: CPT

## 2021-03-16 PROCEDURE — 25010000002 ONDANSETRON PER 1 MG: Performed by: NURSE PRACTITIONER

## 2021-03-16 RX ORDER — SODIUM CHLORIDE 0.9 % (FLUSH) 0.9 %
10 SYRINGE (ML) INJECTION AS NEEDED
Status: DISCONTINUED | OUTPATIENT
Start: 2021-03-16 | End: 2021-03-16 | Stop reason: HOSPADM

## 2021-03-16 RX ORDER — ONDANSETRON 2 MG/ML
4 INJECTION INTRAMUSCULAR; INTRAVENOUS ONCE
Status: COMPLETED | OUTPATIENT
Start: 2021-03-16 | End: 2021-03-16

## 2021-03-16 RX ADMIN — ONDANSETRON HYDROCHLORIDE 4 MG: 2 SOLUTION INTRAMUSCULAR; INTRAVENOUS at 20:52

## 2021-03-16 RX ADMIN — MORPHINE SULFATE 4 MG: 4 INJECTION, SOLUTION INTRAMUSCULAR; INTRAVENOUS at 20:52

## 2021-03-17 NOTE — ED PROVIDER NOTES
Subjective   Pt Is a 61-year-old white male presents the emergency department with substernal chest pain and left arm pain that started last night intermittently.  He states that he started having swelling in his lower extremities last night as well.  He does have a history of CAD with stent placement.  He did receive aspirin and nitroglycerin x1  He states it took his pain from a 10 to a 9.  He states he still having significant pain at this time.  He denies any nausea or vomiting.  He states he has been feeling very tired over the last week.  Denies any cough or congestion.  No fever or chills dates he has had some shortness of breath.      History provided by:  Patient   used: No        Review of Systems   Constitutional: Negative.    HENT: Negative.    Eyes: Negative.    Respiratory: Negative.    Cardiovascular:        Pt Is a 61-year-old white male presents the emergency department with substernal chest pain and left arm pain that started last night intermittently.  He states that he started having swelling in his lower extremities last night as well.  He does have a history of CAD with stent placement.  He did receive aspirin and nitroglycerin x1  He states it took his pain from a 10 to a 9.  He states he still having significant pain at this time.  He denies any nausea or vomiting.  He states he has been feeling very tired over the last week.  Denies any cough or congestion.  No fever or chills dates he has had some shortness of breath.     Gastrointestinal: Negative.    Endocrine: Negative.    Genitourinary: Negative.    Musculoskeletal: Negative.    Skin: Negative.    Allergic/Immunologic: Negative.    Neurological: Negative.    Hematological: Negative.    Psychiatric/Behavioral: Negative.    All other systems reviewed and are negative.      Past Medical History:   Diagnosis Date   • Coronary artery disease    • Degenerative arthritis    • Hx of heart artery stent        Allergies  "  Allergen Reactions   • Contrast Dye Hives   • Levaquin [Levofloxacin] Nausea Only, Swelling, Dizziness and Angioedema       Past Surgical History:   Procedure Laterality Date   • APPENDECTOMY     • CARDIAC CATHETERIZATION     • KNEE ARTHROPLASTY Right    • PERIPHERAL ARTERIAL STENT GRAFT     • TOTAL HIP ARTHROPLASTY Right        Family History   Problem Relation Age of Onset   • Diabetes Mother    • Heart disease Mother    • Heart disease Father        Social History     Socioeconomic History   • Marital status:      Spouse name: Not on file   • Number of children: Not on file   • Years of education: Not on file   • Highest education level: Not on file   Tobacco Use   • Smoking status: Current Every Day Smoker     Packs/day: 2.00     Years: 10.00     Pack years: 20.00   • Smokeless tobacco: Never Used   Substance and Sexual Activity   • Alcohol use: Never   • Drug use: No       Prior to Admission medications    Medication Sig Start Date End Date Taking? Authorizing Provider   aspirin 81 MG chewable tablet Chew 81 mg Daily.    Provider, MD [facility]   atorvastatin (LIPITOR) 20 MG tablet Take 1 tablet by mouth Daily. 9/30/18   Jane Parra APRN   azithromycin (ZITHROMAX) 250 MG tablet Take 2 tablets the first day, then 1 tablet daily for 4 days. 6/7/20   Lloyd Garcia MD   methylPREDNISolone (MEDROL, HARVINDER,) 4 MG tablet Take as directed on package instructions. 6/7/20   Lloyd Garcia MD   metoprolol succinate XL (TOPROL XL) 25 MG 24 hr tablet Take 1 tablet by mouth Daily. 9/30/18   Jane Parra APRN       /92   Pulse 62   Temp 98.3 °F (36.8 °C) (Oral)   Resp 20   Ht 175.3 cm (69\")   Wt 94.8 kg (209 lb)   SpO2 98%   BMI 30.86 kg/m²     Objective   Physical Exam  Vitals and nursing note reviewed.   Constitutional:       Appearance: He is well-developed.   HENT:      Head: Normocephalic and atraumatic.   Eyes:      Conjunctiva/sclera: Conjunctivae normal.      Pupils: Pupils are equal, " round, and reactive to light.   Cardiovascular:      Rate and Rhythm: Normal rate and regular rhythm.      Heart sounds: Normal heart sounds.   Pulmonary:      Effort: Pulmonary effort is normal.      Breath sounds: Normal breath sounds.   Abdominal:      General: Bowel sounds are normal.      Palpations: Abdomen is soft.   Musculoskeletal:         General: Normal range of motion.      Cervical back: Normal range of motion and neck supple.      Right lower leg: Edema present.      Left lower leg: Edema present.      Comments: 1+ pitting edema bilateral lower extremities   Skin:     General: Skin is warm and dry.   Neurological:      Mental Status: He is alert and oriented to person, place, and time.      Deep Tendon Reflexes: Reflexes are normal and symmetric.   Psychiatric:         Behavior: Behavior normal.         Thought Content: Thought content normal.         Judgment: Judgment normal.         Procedures         Lab Results (last 24 hours)     ** No results found for the last 24 hours. **          XR Chest 1 View   Final Result   No active disease is seen.           This report was finalized on 03/16/2021 19:45 by Dr. Konrad Gomez MD.          ED Course  ED Course as of Mar 17 2232   Tue Mar 16, 2021   2200 Advised by nursing staff that pt eloped from the er     [CW]      ED Course User Index  [CW] Aracelis Tellez APRN          MDM  Number of Diagnoses or Management Options  Chest pain, unspecified type: minor  Patient Progress  Patient progress: stable      Final diagnoses:   Chest pain, unspecified type          Aracelis Tellez APRN  03/17/21 2232

## 2021-03-18 LAB
QT INTERVAL: 418 MS
QTC INTERVAL: 447 MS

## 2021-03-19 ENCOUNTER — OFFICE VISIT (OUTPATIENT)
Dept: INTERNAL MEDICINE | Facility: CLINIC | Age: 61
End: 2021-03-19

## 2021-03-19 VITALS
HEIGHT: 69 IN | WEIGHT: 205.3 LBS | DIASTOLIC BLOOD PRESSURE: 84 MMHG | HEART RATE: 83 BPM | SYSTOLIC BLOOD PRESSURE: 140 MMHG | BODY MASS INDEX: 30.41 KG/M2 | RESPIRATION RATE: 18 BRPM | OXYGEN SATURATION: 97 % | TEMPERATURE: 97.3 F

## 2021-03-19 DIAGNOSIS — I25.10 CARDIOVASCULAR DISEASE: ICD-10-CM

## 2021-03-19 DIAGNOSIS — M79.622 PAIN IN BOTH UPPER ARMS: ICD-10-CM

## 2021-03-19 DIAGNOSIS — M79.89 SWELLING OF BOTH LOWER EXTREMITIES: ICD-10-CM

## 2021-03-19 DIAGNOSIS — M25.50 ARTHRALGIA, UNSPECIFIED JOINT: ICD-10-CM

## 2021-03-19 DIAGNOSIS — M79.662 PAIN IN BOTH LOWER LEGS: Primary | ICD-10-CM

## 2021-03-19 DIAGNOSIS — M79.661 PAIN IN BOTH LOWER LEGS: Primary | ICD-10-CM

## 2021-03-19 DIAGNOSIS — M79.621 PAIN IN BOTH UPPER ARMS: ICD-10-CM

## 2021-03-19 PROBLEM — I25.119 CORONARY ARTERY DISEASE INVOLVING NATIVE CORONARY ARTERY OF NATIVE HEART WITH ANGINA PECTORIS: Status: RESOLVED | Noted: 2018-10-04 | Resolved: 2021-03-19

## 2021-03-19 PROCEDURE — 99204 OFFICE O/P NEW MOD 45 MIN: CPT | Performed by: FAMILY MEDICINE

## 2021-03-19 RX ORDER — GABAPENTIN 300 MG/1
300 CAPSULE ORAL 3 TIMES DAILY
Qty: 90 CAPSULE | Refills: 0 | Status: SHIPPED | OUTPATIENT
Start: 2021-03-19 | End: 2021-04-09

## 2021-03-19 NOTE — PROGRESS NOTES
Subjective     Chief Complaint   Patient presents with   • Leg Swelling     4 days ago   • Arm Swelling       History of Present Illness  Onset of symptoms last Friday   Swelling of legs and arms.   Took a 30 mg lasix with a little improvement in the swelling.  Also started having tingling/numb in arms and legs at the same time.  Again it is bilateral.   Takes an 81 mg ASA daily.  Stopped lipitor and metoprolol on his own.  Sometime ago.  Not sleeping well.  Gets up and down all night long.  Has been using some tylenol for the pain it does not seem to be working well.  He did go to the [facility] ER.  He really feels like he did not get any help there.    Patient's PMR from outside medical facility reviewed and noted.    Review of Systems   HENT: Negative.    Eyes: Negative.    Respiratory: Negative.    Cardiovascular: Positive for leg swelling.   Endocrine: Negative.    Genitourinary: Negative.    Musculoskeletal: Positive for arthralgias, joint swelling and myalgias.   Skin:        Bruising   Allergic/Immunologic: Negative.    Neurological: Positive for weakness.   Hematological: Bruises/bleeds easily.   Psychiatric/Behavioral: Negative.         Otherwise complete ROS reviewed and negative except as mentioned in the HPI.    Past Medical History:   Past Medical History:   Diagnosis Date   • Coronary artery disease    • Degenerative arthritis    • Diverticulosis    • Hx of heart artery stent      Past Surgical History:  Past Surgical History:   Procedure Laterality Date   • APPENDECTOMY     • CARDIAC CATHETERIZATION     • KNEE ARTHROPLASTY Right    • PERIPHERAL ARTERIAL STENT GRAFT     • TOTAL HIP ARTHROPLASTY Right      Social History:  reports that he has been smoking cigarettes. He has a 20.00 pack-year smoking history. He has never used smokeless tobacco. He reports that he does not drink alcohol and does not use drugs.    Family History: family history includes Diabetes in his mother; Heart disease in his  "father and mother.       Allergies:  Allergies   Allergen Reactions   • Contrast Dye Hives   • Levaquin [Levofloxacin] Nausea Only, Swelling, Dizziness and Angioedema     Medications:  Prior to Admission medications    Medication Sig Start Date End Date Taking? Authorizing Provider   aspirin 81 MG chewable tablet Chew 81 mg Daily.    Provider, MD [facility]   atorvastatin (LIPITOR) 20 MG tablet Take 1 tablet by mouth Daily. 9/30/18   Jane Parra APRN   azithromycin (ZITHROMAX) 250 MG tablet Take 2 tablets the first day, then 1 tablet daily for 4 days. 6/7/20   Lloyd Garcia MD   methylPREDNISolone (MEDROL, HARVINDER,) 4 MG tablet Take as directed on package instructions. 6/7/20   Lloyd Garcia MD   metoprolol succinate XL (TOPROL XL) 25 MG 24 hr tablet Take 1 tablet by mouth Daily. 9/30/18   Jane Parra APRN       Objective     Vital Signs: /84 (BP Location: Right arm, Patient Position: Sitting, Cuff Size: Adult)   Pulse 83   Temp 97.3 °F (36.3 °C) (Skin)   Resp 18   Ht 175.3 cm (69\")   Wt 93.1 kg (205 lb 4.8 oz)   SpO2 97%   BMI 30.32 kg/m²   Physical Exam  Vitals and nursing note reviewed.   Constitutional:       Appearance: Normal appearance. He is not ill-appearing, toxic-appearing or diaphoretic.   HENT:      Head: Normocephalic and atraumatic.      Right Ear: Tympanic membrane, ear canal and external ear normal.      Left Ear: Tympanic membrane, ear canal and external ear normal.      Nose: Nose normal. No congestion.      Mouth/Throat:      Mouth: Mucous membranes are moist.      Pharynx: Oropharynx is clear.   Eyes:      General: No scleral icterus.     Extraocular Movements: Extraocular movements intact.      Conjunctiva/sclera: Conjunctivae normal.      Pupils: Pupils are equal, round, and reactive to light.   Cardiovascular:      Rate and Rhythm: Normal rate and regular rhythm.      Pulses: Normal pulses.      Heart sounds: Normal heart sounds.   Pulmonary:      Effort: Pulmonary " effort is normal.      Breath sounds: Normal breath sounds.   Abdominal:      General: Bowel sounds are normal.      Palpations: Abdomen is soft.   Musculoskeletal:      Cervical back: Normal range of motion and neck supple.      Comments: Patient has swelling of bilateral lower extremities with 2+ pitting edema to the knees bilaterally.  He has tenderness palpation over the ankle joints knee joints.  He has tenderness palpation of the lower back into the buttock region and the PSIS joint.  He has tenderness palpation over bilateral wrists and elbows.  He is able to move extremities through full range of motion.   Skin:     General: Skin is warm and dry.      Capillary Refill: Capillary refill takes less than 2 seconds.      Findings: Bruising present.   Neurological:      General: No focal deficit present.      Mental Status: He is alert and oriented to person, place, and time.   Psychiatric:         Mood and Affect: Mood normal.         Behavior: Behavior normal.         Thought Content: Thought content normal.         Judgment: Judgment normal.         Patient's Body mass index is 30.32 kg/m².       Results Reviewed:  Glucose   Date Value Ref Range Status   06/07/2020 127 (H) 65 - 99 mg/dL Final     BUN   Date Value Ref Range Status   06/07/2020 14 8 - 23 mg/dL Final   06/24/2019 11 7 - 18 mg/dL Final     Creatinine   Date Value Ref Range Status   06/07/2020 1.01 0.76 - 1.27 mg/dL Final   06/24/2019 1.10 0.60 - 1.30 mg/dL Final     Sodium   Date Value Ref Range Status   06/07/2020 138 136 - 145 mmol/L Final   06/24/2019 138 135 - 145 mmol/L Final     Potassium   Date Value Ref Range Status   06/07/2020 5.1 3.5 - 5.2 mmol/L Final   06/24/2019 4.6 3.5 - 5.0 mmol/L Final     Chloride   Date Value Ref Range Status   06/07/2020 101 98 - 107 mmol/L Final   06/24/2019 101 98 - 107 mmol/L Final     CO2   Date Value Ref Range Status   06/07/2020 25.0 22.0 - 29.0 mmol/L Final     Total CO2   Date Value Ref Range Status    06/24/2019 26 21 - 32 mmol/L Final     Calcium   Date Value Ref Range Status   06/07/2020 9.2 8.6 - 10.5 mg/dL Final   06/24/2019 8.9 8.5 - 10.1 mg/dL Final     Comment:       Calcium measurements are adversely affected by the use of Omniscan during MRI. Analysis of calcium is not recommended for 12 to 24 hours after the use of the contrast agent.      ALT (SGPT)   Date Value Ref Range Status   06/07/2020 20 1 - 41 U/L Final   06/24/2019 33 16 - 62 U/L Final     AST (SGOT)   Date Value Ref Range Status   06/07/2020 18 1 - 40 U/L Final     Comment:     Specimen hemolyzed.  Results may be affected.   06/24/2019 18 7 - 34 U/L Final     WBC   Date Value Ref Range Status   03/16/2021 7.88 3.40 - 10.80 10*3/mm3 Final   06/24/2019 8.7 5.0 - 10.0 K/uL Final     Hematocrit   Date Value Ref Range Status   03/16/2021 41.8 37.5 - 51.0 % Final   06/24/2019 47.6 40.0 - 54.0 % Final     Platelets   Date Value Ref Range Status   03/16/2021 357 140 - 450 10*3/mm3 Final   06/24/2019 314 150 - 500 K/uL Final     Total Cholesterol   Date Value Ref Range Status   09/30/2018 190 130 - 200 mg/dL Final     Triglycerides   Date Value Ref Range Status   09/30/2018 244 (H) 0 - 149 mg/dL Final     HDL Cholesterol   Date Value Ref Range Status   09/30/2018 33 (L) >=40 mg/dL Final     LDL Cholesterol    Date Value Ref Range Status   09/30/2018 130 (H) 0 - 99 mg/dL Final     LDL/HDL Ratio   Date Value Ref Range Status   09/30/2018 3.28  Final     Hemoglobin A1C   Date Value Ref Range Status   09/30/2018 6.1 % Final         Assessment / Plan     Assessment/Plan:  1. Pain in both lower legs    - XR Spine Lumbar 2 or 3 View (In Office)  - gabapentin (NEURONTIN) 300 MG capsule; Take 1 capsule by mouth 3 (Three) Times a Day.  Dispense: 90 capsule; Refill: 0    2. Pain in both upper arms    - XR Spine Thoracic 2 View (In Office)  - gabapentin (NEURONTIN) 300 MG capsule; Take 1 capsule by mouth 3 (Three) Times a Day.  Dispense: 90 capsule; Refill:  0    3. Swelling of both lower extremities    - Comprehensive metabolic panel    4. Arthralgia, unspecified joint    - C-reactive protein  - ANABEL  - Rheumatoid Factor, Quant  - Cyclic Citrul Peptide Antibody, IgG / IgA    5. Cardiovascular disease    I have asked the patient to resume the metoprolol.  Monitor blood pressure at home goal 130/80 or less.  Avoid the use of Lasix unless he becomes grossly swollen.  Chest pain or increased shortness of breath occurs go to the emergency room      Return in about 4 days (around 3/23/2021). unless patient needs to be seen sooner or acute issues arise.      I have discussed the patient results/orders and and plan/recommendation with them at today's visit.      Sravanthi Oliveros, DO   03/19/2021

## 2021-03-23 ENCOUNTER — TELEPHONE (OUTPATIENT)
Dept: INTERNAL MEDICINE | Facility: CLINIC | Age: 61
End: 2021-03-23

## 2021-03-23 ENCOUNTER — OFFICE VISIT (OUTPATIENT)
Dept: INTERNAL MEDICINE | Facility: CLINIC | Age: 61
End: 2021-03-23

## 2021-03-23 VITALS
RESPIRATION RATE: 18 BRPM | WEIGHT: 205 LBS | OXYGEN SATURATION: 98 % | HEIGHT: 69 IN | TEMPERATURE: 98.9 F | BODY MASS INDEX: 30.36 KG/M2 | HEART RATE: 68 BPM | DIASTOLIC BLOOD PRESSURE: 67 MMHG | SYSTOLIC BLOOD PRESSURE: 138 MMHG

## 2021-03-23 DIAGNOSIS — M79.604 LOWER EXTREMITY PAIN, LATERAL, RIGHT: ICD-10-CM

## 2021-03-23 DIAGNOSIS — R11.0 NAUSEA: ICD-10-CM

## 2021-03-23 DIAGNOSIS — I10 ESSENTIAL HYPERTENSION: ICD-10-CM

## 2021-03-23 DIAGNOSIS — I73.9 PAD (PERIPHERAL ARTERY DISEASE) (HCC): Primary | ICD-10-CM

## 2021-03-23 LAB
ALBUMIN SERPL-MCNC: 4.1 G/DL (ref 3.8–4.8)
ALBUMIN/GLOB SERPL: 1.5 {RATIO} (ref 1.2–2.2)
ALP SERPL-CCNC: 133 IU/L (ref 39–117)
ALT SERPL-CCNC: 25 IU/L (ref 0–44)
ANA SER QL: NEGATIVE
AST SERPL-CCNC: 28 IU/L (ref 0–40)
BILIRUB SERPL-MCNC: 0.4 MG/DL (ref 0–1.2)
BUN SERPL-MCNC: 20 MG/DL (ref 8–27)
BUN/CREAT SERPL: 16 (ref 10–24)
CALCIUM SERPL-MCNC: 9 MG/DL (ref 8.6–10.2)
CCP IGA+IGG SERPL IA-ACNC: 6 UNITS (ref 0–19)
CHLORIDE SERPL-SCNC: 101 MMOL/L (ref 96–106)
CO2 SERPL-SCNC: 22 MMOL/L (ref 20–29)
CREAT SERPL-MCNC: 1.24 MG/DL (ref 0.76–1.27)
CRP SERPL-MCNC: 10 MG/L (ref 0–10)
GLOBULIN SER CALC-MCNC: 2.7 G/DL (ref 1.5–4.5)
GLUCOSE SERPL-MCNC: 102 MG/DL (ref 65–99)
POTASSIUM SERPL-SCNC: 4.7 MMOL/L (ref 3.5–5.2)
PROT SERPL-MCNC: 6.8 G/DL (ref 6–8.5)
RHEUMATOID FACT SERPL-ACNC: <10 IU/ML (ref 0–13.9)
SODIUM SERPL-SCNC: 136 MMOL/L (ref 134–144)

## 2021-03-23 PROCEDURE — 99214 OFFICE O/P EST MOD 30 MIN: CPT | Performed by: FAMILY MEDICINE

## 2021-03-23 RX ORDER — ONDANSETRON 4 MG/1
4 TABLET, ORALLY DISINTEGRATING ORAL EVERY 8 HOURS PRN
Qty: 30 TABLET | Refills: 3 | Status: SHIPPED | OUTPATIENT
Start: 2021-03-23 | End: 2021-05-27 | Stop reason: SDUPTHER

## 2021-03-23 RX ORDER — METHYLPREDNISOLONE 4 MG/1
TABLET ORAL
Qty: 1 EACH | Refills: 0 | Status: SHIPPED | OUTPATIENT
Start: 2021-03-23 | End: 2021-04-28

## 2021-03-23 NOTE — TELEPHONE ENCOUNTER
"Called patient to discuss Xray results. Patient voiced understanding. Patient states he thinks he may have tetanus. He explains that dropped an old mirror and it cut the back of his foot, but he didn't think anything about it at the office visit last week. His symptoms are \"much worse\". Patient has an appointment this afternoon.     "

## 2021-03-23 NOTE — PROGRESS NOTES
Subjective     Chief Complaint   Patient presents with   • Leg Pain     Follow up. Pain is worse.        History of Present Illness  Flushed and hot gets sick to stomach and has to lie down  Watching diet well  Right leg pain   Left hip pain  Bilateral elbow and wrists.   Aching to sharp pain.  Is not sleeping well.  Has to sit up in recliner.   Patient's PMR from outside medical facility reviewed and noted.    Review of Systems     Otherwise complete ROS reviewed and negative except as mentioned in the HPI.    Past Medical History:   Past Medical History:   Diagnosis Date   • Coronary artery disease    • Degenerative arthritis    • Diverticulosis    • Hx of heart artery stent      Past Surgical History:  Past Surgical History:   Procedure Laterality Date   • APPENDECTOMY     • CARDIAC CATHETERIZATION     • KNEE ARTHROPLASTY Right    • PERIPHERAL ARTERIAL STENT GRAFT     • TOTAL HIP ARTHROPLASTY Right      Social History:  reports that he has been smoking cigarettes. He has a 20.00 pack-year smoking history. He has never used smokeless tobacco. He reports that he does not drink alcohol and does not use drugs.    Family History: family history includes Diabetes in his mother; Heart disease in his father and mother.      Allergies:  Allergies   Allergen Reactions   • Contrast Dye Hives   • Levaquin [Levofloxacin] Nausea Only, Swelling, Dizziness and Angioedema     Medications:  Prior to Admission medications    Medication Sig Start Date End Date Taking? Authorizing Provider   aspirin 81 MG chewable tablet Chew 81 mg Daily.   Yes Provider, MD [facility]   gabapentin (NEURONTIN) 300 MG capsule Take 1 capsule by mouth 3 (Three) Times a Day. 3/19/21  Yes Sravanthi Oliveros DO   metoprolol tartrate (LOPRESSOR) 25 MG tablet Take 1 tablet by mouth 2 (Two) Times a Day. 3/19/21  Yes Sravanthi Oliveros DO       Objective     Vital Signs: /67 (BP Location: Left arm, Patient Position: Sitting, Cuff Size: Adult)  "  Pulse 68   Temp 98.9 °F (37.2 °C) (Skin)   Resp 18   Ht 175.3 cm (69\")   Wt 93 kg (205 lb)   SpO2 98%   BMI 30.27 kg/m²   Physical Exam  Vitals and nursing note reviewed.   Constitutional:       General: He is in acute distress.      Appearance: Normal appearance. He is not diaphoretic.   HENT:      Head: Normocephalic and atraumatic.      Right Ear: External ear normal.      Left Ear: External ear normal.      Nose: Nose normal.      Mouth/Throat:      Mouth: Mucous membranes are moist.      Pharynx: Oropharynx is clear.   Eyes:      Extraocular Movements: Extraocular movements intact.      Conjunctiva/sclera: Conjunctivae normal.      Pupils: Pupils are equal, round, and reactive to light.   Cardiovascular:      Rate and Rhythm: Regular rhythm.      Heart sounds: Normal heart sounds. No murmur heard.     Pulmonary:      Effort: Pulmonary effort is normal.      Breath sounds: Normal breath sounds.   Abdominal:      General: Bowel sounds are normal.      Palpations: Abdomen is soft.   Musculoskeletal:      Cervical back: Normal range of motion and neck supple.      Comments: Posture is erect.  Gait is altered.  Morbid almost considered a hobble.  He has tenderness to palpation of the entire length of the leg on the right.  Currently I am unable to palpate his pulses in the right foot.  Pulses obtained by Doppler.  In both posterior tibial and dorsalis pedis position.   Skin:     General: Skin is warm and dry.      Capillary Refill: Capillary refill takes 2 to 3 seconds.      Comments: The right lower extremity is somewhat erythematous.  There is no increased heat.  Particularly around the toes there is almost a violaceous color.   Neurological:      General: No focal deficit present.      Mental Status: He is alert and oriented to person, place, and time.   Psychiatric:         Mood and Affect: Mood normal.         Behavior: Behavior normal.         Thought Content: Thought content normal.         Judgment: " Judgment normal.       Patient's Body mass index is 30.27 kg/m².     Results Reviewed:  Glucose   Date Value Ref Range Status   06/07/2020 127 (H) 65 - 99 mg/dL Final     BUN   Date Value Ref Range Status   03/19/2021 20 8 - 27 mg/dL Final   06/07/2020 14 8 - 23 mg/dL Final   06/24/2019 11 7 - 18 mg/dL Final     Creatinine   Date Value Ref Range Status   03/19/2021 1.24 0.76 - 1.27 mg/dL Final   06/07/2020 1.01 0.76 - 1.27 mg/dL Final   06/24/2019 1.10 0.60 - 1.30 mg/dL Final     Sodium   Date Value Ref Range Status   03/19/2021 136 134 - 144 mmol/L Final   06/07/2020 138 136 - 145 mmol/L Final   06/24/2019 138 135 - 145 mmol/L Final     Potassium   Date Value Ref Range Status   03/19/2021 4.7 3.5 - 5.2 mmol/L Final   06/07/2020 5.1 3.5 - 5.2 mmol/L Final   06/24/2019 4.6 3.5 - 5.0 mmol/L Final     Chloride   Date Value Ref Range Status   03/19/2021 101 96 - 106 mmol/L Final   06/07/2020 101 98 - 107 mmol/L Final   06/24/2019 101 98 - 107 mmol/L Final     CO2   Date Value Ref Range Status   06/07/2020 25.0 22.0 - 29.0 mmol/L Final     Total CO2   Date Value Ref Range Status   03/19/2021 22 20 - 29 mmol/L Final   06/24/2019 26 21 - 32 mmol/L Final     Calcium   Date Value Ref Range Status   03/19/2021 9.0 8.6 - 10.2 mg/dL Final   06/07/2020 9.2 8.6 - 10.5 mg/dL Final   06/24/2019 8.9 8.5 - 10.1 mg/dL Final     Comment:       Calcium measurements are adversely affected by the use of Omniscan during MRI. Analysis of calcium is not recommended for 12 to 24 hours after the use of the contrast agent.      ALT (SGPT)   Date Value Ref Range Status   03/19/2021 25 0 - 44 IU/L Final   06/07/2020 20 1 - 41 U/L Final   06/24/2019 33 16 - 62 U/L Final     AST (SGOT)   Date Value Ref Range Status   03/19/2021 28 0 - 40 IU/L Final   06/07/2020 18 1 - 40 U/L Final     Comment:     Specimen hemolyzed.  Results may be affected.   06/24/2019 18 7 - 34 U/L Final     WBC   Date Value Ref Range Status   03/16/2021 7.88 3.40 - 10.80  10*3/mm3 Final   06/24/2019 8.7 5.0 - 10.0 K/uL Final     Hematocrit   Date Value Ref Range Status   03/16/2021 41.8 37.5 - 51.0 % Final   06/24/2019 47.6 40.0 - 54.0 % Final     Platelets   Date Value Ref Range Status   03/16/2021 357 140 - 450 10*3/mm3 Final   06/24/2019 314 150 - 500 K/uL Final     Total Cholesterol   Date Value Ref Range Status   09/30/2018 190 130 - 200 mg/dL Final     Triglycerides   Date Value Ref Range Status   09/30/2018 244 (H) 0 - 149 mg/dL Final     HDL Cholesterol   Date Value Ref Range Status   09/30/2018 33 (L) >=40 mg/dL Final     LDL Cholesterol    Date Value Ref Range Status   09/30/2018 130 (H) 0 - 99 mg/dL Final     LDL/HDL Ratio   Date Value Ref Range Status   09/30/2018 3.28  Final     Hemoglobin A1C   Date Value Ref Range Status   09/30/2018 6.1 % Final         Assessment / Plan     Assessment/Plan:  1. PAD (peripheral artery disease) (CMS/HCC)    - CT Angiogram Lower Extremity Right With & Without Contrast; Future    2. Lower extremity pain, lateral, right worse  Medrol Dosepak      3. Nausea  zofran 4 mg q8h prn nausea    4. Essential hypertension  Continue metoprolol        Return in about 2 weeks (around 4/6/2021), or if symptoms worsen or fail to improve. unless patient needs to be seen sooner or acute issues arise.        I have discussed the patient results/orders and and plan/recommendation with them at today's visit.      Sravanthi Oliveros,    03/23/2021

## 2021-03-23 NOTE — TELEPHONE ENCOUNTER
----- Message from Sravanthi Oliveros DO sent at 3/19/2021  5:41 PM CDT -----  Arthritic changes in thoracic and lumbar spine

## 2021-04-01 ENCOUNTER — APPOINTMENT (OUTPATIENT)
Dept: CT IMAGING | Facility: HOSPITAL | Age: 61
End: 2021-04-01

## 2021-04-09 ENCOUNTER — OFFICE VISIT (OUTPATIENT)
Dept: INTERNAL MEDICINE | Facility: CLINIC | Age: 61
End: 2021-04-09

## 2021-04-09 VITALS
TEMPERATURE: 98.7 F | HEIGHT: 69 IN | SYSTOLIC BLOOD PRESSURE: 136 MMHG | OXYGEN SATURATION: 98 % | DIASTOLIC BLOOD PRESSURE: 79 MMHG | WEIGHT: 205.6 LBS | BODY MASS INDEX: 30.45 KG/M2 | RESPIRATION RATE: 18 BRPM | HEART RATE: 78 BPM

## 2021-04-09 DIAGNOSIS — M79.622 PAIN IN BOTH UPPER ARMS: ICD-10-CM

## 2021-04-09 DIAGNOSIS — R60.0 LOCALIZED EDEMA: Primary | ICD-10-CM

## 2021-04-09 DIAGNOSIS — M79.621 PAIN IN BOTH UPPER ARMS: ICD-10-CM

## 2021-04-09 DIAGNOSIS — M79.662 PAIN IN BOTH LOWER LEGS: ICD-10-CM

## 2021-04-09 DIAGNOSIS — M79.661 PAIN IN BOTH LOWER LEGS: ICD-10-CM

## 2021-04-09 PROCEDURE — 99213 OFFICE O/P EST LOW 20 MIN: CPT | Performed by: FAMILY MEDICINE

## 2021-04-09 RX ORDER — GABAPENTIN 300 MG/1
300 CAPSULE ORAL 4 TIMES DAILY
Qty: 120 CAPSULE | Refills: 3 | Status: SHIPPED | OUTPATIENT
Start: 2021-04-09 | End: 2021-04-28

## 2021-04-09 NOTE — PROGRESS NOTES
Subjective     Chief Complaint   Patient presents with   • Leg Pain     Pain has improved.        History of Present Illness  Patient returns for evaluation of the bilateral lower extremity pain.  The increase in the dose of the Neurontin has markedly helped with pain.  He notes he did have to take it earlier yesterday because he was having a lot of discomfort.  Currently he is only taking it 3 times daily.  He notes that he is even had less swelling in his extremities.  His blood pressure is better controlled.  He was actually able to do some painting yesterday.  Patient's PMR from outside medical facility reviewed and noted.    Review of Systems     Otherwise complete ROS reviewed and negative except as mentioned in the HPI.    Past Medical History:   Past Medical History:   Diagnosis Date   • Coronary artery disease    • Degenerative arthritis    • Diverticulosis    • Hx of heart artery stent      Past Surgical History:  Past Surgical History:   Procedure Laterality Date   • APPENDECTOMY     • CARDIAC CATHETERIZATION     • KNEE ARTHROPLASTY Right    • PERIPHERAL ARTERIAL STENT GRAFT     • TOTAL HIP ARTHROPLASTY Right      Social History:  reports that he has been smoking cigarettes. He has a 20.00 pack-year smoking history. He has never used smokeless tobacco. He reports that he does not drink alcohol and does not use drugs.    Family History: family history includes Diabetes in his mother; Heart disease in his father and mother.       Allergies:  Allergies   Allergen Reactions   • Contrast Dye Hives   • Levaquin [Levofloxacin] Nausea Only, Swelling, Dizziness and Angioedema     Medications:  Prior to Admission medications    Medication Sig Start Date End Date Taking? Authorizing Provider   aspirin 81 MG chewable tablet Chew 81 mg Daily.   Yes Provider, MD [facility]   gabapentin (NEURONTIN) 300 MG capsule Take 1 capsule by mouth 4 (Four) Times a Day. 4/9/21  Yes Sravanthi Oliveros DO  "  methylPREDNISolone (MEDROL) 4 MG dose pack Take as directed on package instructions. 3/23/21  Yes Sravanthi Oliveros DO   metoprolol tartrate (LOPRESSOR) 25 MG tablet Take 1 tablet by mouth 2 (Two) Times a Day. 3/19/21  Yes Sravanthi Oliveros DO   ondansetron ODT (Zofran ODT) 4 MG disintegrating tablet Place 1 tablet on the tongue Every 8 (Eight) Hours As Needed for Nausea or Vomiting. 3/23/21  Yes Sravanthi Oliveros DO   gabapentin (NEURONTIN) 300 MG capsule Take 1 capsule by mouth 3 (Three) Times a Day. 3/19/21 4/9/21 Yes Sravanthi Oliveros DO       Objective     Vital Signs: /79 (BP Location: Right arm, Patient Position: Sitting, Cuff Size: Adult)   Pulse 78   Temp 98.7 °F (37.1 °C) (Skin)   Resp 18   Ht 175.3 cm (69\")   Wt 93.3 kg (205 lb 9.6 oz)   SpO2 98%   BMI 30.36 kg/m²   Physical Exam  Vitals and nursing note reviewed.   Constitutional:       General: He is not in acute distress.     Appearance: Normal appearance.   HENT:      Head: Normocephalic.      Right Ear: External ear normal.      Left Ear: External ear normal.      Mouth/Throat:      Mouth: Mucous membranes are moist.   Eyes:      Pupils: Pupils are equal, round, and reactive to light.   Cardiovascular:      Rate and Rhythm: Normal rate and regular rhythm.      Pulses: Normal pulses.      Heart sounds: Normal heart sounds.   Pulmonary:      Effort: Pulmonary effort is normal.   Abdominal:      General: Bowel sounds are normal.      Palpations: Abdomen is soft.   Musculoskeletal:         General: Swelling (1+ pretibial edema bilaterally) present. Normal range of motion.      Cervical back: Normal range of motion.      Comments: Patient's gait is much more fluid today.  He is not stooped from pain.   Skin:     General: Skin is warm and dry.      Capillary Refill: Capillary refill takes less than 2 seconds.   Neurological:      General: No focal deficit present.      Mental Status: He is alert and oriented to person, place, and " time.   Psychiatric:         Mood and Affect: Mood normal.         Behavior: Behavior normal.         Thought Content: Thought content normal.         Judgment: Judgment normal.         Patient's Body mass index is 30.36 kg/m².       Results Reviewed:  Glucose   Date Value Ref Range Status   06/07/2020 127 (H) 65 - 99 mg/dL Final     BUN   Date Value Ref Range Status   03/19/2021 20 8 - 27 mg/dL Final   06/07/2020 14 8 - 23 mg/dL Final   06/24/2019 11 7 - 18 mg/dL Final     Creatinine   Date Value Ref Range Status   03/19/2021 1.24 0.76 - 1.27 mg/dL Final   06/07/2020 1.01 0.76 - 1.27 mg/dL Final   06/24/2019 1.10 0.60 - 1.30 mg/dL Final     Sodium   Date Value Ref Range Status   03/19/2021 136 134 - 144 mmol/L Final   06/07/2020 138 136 - 145 mmol/L Final   06/24/2019 138 135 - 145 mmol/L Final     Potassium   Date Value Ref Range Status   03/19/2021 4.7 3.5 - 5.2 mmol/L Final   06/07/2020 5.1 3.5 - 5.2 mmol/L Final   06/24/2019 4.6 3.5 - 5.0 mmol/L Final     Chloride   Date Value Ref Range Status   03/19/2021 101 96 - 106 mmol/L Final   06/07/2020 101 98 - 107 mmol/L Final   06/24/2019 101 98 - 107 mmol/L Final     CO2   Date Value Ref Range Status   06/07/2020 25.0 22.0 - 29.0 mmol/L Final     Total CO2   Date Value Ref Range Status   03/19/2021 22 20 - 29 mmol/L Final   06/24/2019 26 21 - 32 mmol/L Final     Calcium   Date Value Ref Range Status   03/19/2021 9.0 8.6 - 10.2 mg/dL Final   06/07/2020 9.2 8.6 - 10.5 mg/dL Final   06/24/2019 8.9 8.5 - 10.1 mg/dL Final     Comment:       Calcium measurements are adversely affected by the use of Omniscan during MRI. Analysis of calcium is not recommended for 12 to 24 hours after the use of the contrast agent.      ALT (SGPT)   Date Value Ref Range Status   03/19/2021 25 0 - 44 IU/L Final   06/07/2020 20 1 - 41 U/L Final   06/24/2019 33 16 - 62 U/L Final     AST (SGOT)   Date Value Ref Range Status   03/19/2021 28 0 - 40 IU/L Final   06/07/2020 18 1 - 40 U/L Final      Comment:     Specimen hemolyzed.  Results may be affected.   06/24/2019 18 7 - 34 U/L Final     WBC   Date Value Ref Range Status   03/16/2021 7.88 3.40 - 10.80 10*3/mm3 Final   06/24/2019 8.7 5.0 - 10.0 K/uL Final     Hematocrit   Date Value Ref Range Status   03/16/2021 41.8 37.5 - 51.0 % Final   06/24/2019 47.6 40.0 - 54.0 % Final     Platelets   Date Value Ref Range Status   03/16/2021 357 140 - 450 10*3/mm3 Final   06/24/2019 314 150 - 500 K/uL Final     Total Cholesterol   Date Value Ref Range Status   09/30/2018 190 130 - 200 mg/dL Final     Triglycerides   Date Value Ref Range Status   09/30/2018 244 (H) 0 - 149 mg/dL Final     HDL Cholesterol   Date Value Ref Range Status   09/30/2018 33 (L) >=40 mg/dL Final     LDL Cholesterol    Date Value Ref Range Status   09/30/2018 130 (H) 0 - 99 mg/dL Final     LDL/HDL Ratio   Date Value Ref Range Status   09/30/2018 3.28  Final     Hemoglobin A1C   Date Value Ref Range Status   09/30/2018 6.1 % Final         Assessment / Plan     Assessment/Plan:  1. Pain in both lower legs    - gabapentin (NEURONTIN) 300 MG capsule; Take 1 capsule by mouth 4 (Four) Times a Day.  Dispense: 120 capsule; Refill: 3    2. Pain in both upper arms    - gabapentin (NEURONTIN) 300 MG capsule; Take 1 capsule by mouth 4 (Four) Times a Day.  Dispense: 120 capsule; Refill: 3    3. Localized edema    Patient is to keep legs elevated when sitting  Increase the Neurontin as above.  Monitor for pain control.  Monitor for sedation.  He is to follow-up in 2 months prior to had anti hypertensive running out.      Return in about 2 months (around 6/9/2021). unless patient needs to be seen sooner or acute issues arise.        I have discussed the patient results/orders and and plan/recommendation with them at today's visit.      Sravanthi Oliveros,    04/09/2021

## 2021-04-28 ENCOUNTER — OFFICE VISIT (OUTPATIENT)
Dept: INTERNAL MEDICINE | Facility: CLINIC | Age: 61
End: 2021-04-28

## 2021-04-28 VITALS
HEART RATE: 75 BPM | BODY MASS INDEX: 30.81 KG/M2 | DIASTOLIC BLOOD PRESSURE: 89 MMHG | WEIGHT: 208 LBS | OXYGEN SATURATION: 98 % | TEMPERATURE: 98.6 F | SYSTOLIC BLOOD PRESSURE: 153 MMHG | HEIGHT: 69 IN

## 2021-04-28 DIAGNOSIS — M79.661 PAIN IN BOTH LOWER LEGS: Primary | ICD-10-CM

## 2021-04-28 DIAGNOSIS — L08.9 BACTERIAL SKIN INFECTION: ICD-10-CM

## 2021-04-28 DIAGNOSIS — M79.662 PAIN IN BOTH LOWER LEGS: Primary | ICD-10-CM

## 2021-04-28 DIAGNOSIS — J40 BRONCHITIS: ICD-10-CM

## 2021-04-28 DIAGNOSIS — R60.9 1+ PITTING EDEMA: ICD-10-CM

## 2021-04-28 DIAGNOSIS — B96.89 BACTERIAL SKIN INFECTION: ICD-10-CM

## 2021-04-28 PROCEDURE — 99214 OFFICE O/P EST MOD 30 MIN: CPT | Performed by: FAMILY MEDICINE

## 2021-04-28 RX ORDER — GABAPENTIN 300 MG/1
600 CAPSULE ORAL 4 TIMES DAILY
Qty: 120 CAPSULE | Refills: 3 | Status: SHIPPED | OUTPATIENT
Start: 2021-04-28 | End: 2021-04-28

## 2021-04-28 RX ORDER — GABAPENTIN 300 MG/1
600 CAPSULE ORAL 4 TIMES DAILY
Qty: 240 CAPSULE | Refills: 7 | Status: SHIPPED | OUTPATIENT
Start: 2021-04-28 | End: 2021-10-27

## 2021-04-28 RX ORDER — FUROSEMIDE 20 MG/1
20 TABLET ORAL DAILY
Qty: 30 TABLET | Refills: 0 | Status: SHIPPED | OUTPATIENT
Start: 2021-04-28 | End: 2021-06-17

## 2021-04-28 RX ORDER — DOXYCYCLINE 100 MG/1
100 CAPSULE ORAL 2 TIMES DAILY
Qty: 20 CAPSULE | Refills: 0 | Status: SHIPPED | OUTPATIENT
Start: 2021-04-28 | End: 2021-08-25

## 2021-05-06 ENCOUNTER — TELEPHONE (OUTPATIENT)
Dept: INTERNAL MEDICINE | Facility: CLINIC | Age: 61
End: 2021-05-06

## 2021-05-06 NOTE — TELEPHONE ENCOUNTER
Caller: Jalil Darby    Relationship: Self    Best call back number: [phone] (H)    What is the best time to reach you: ANYTIME     Who are you requesting to speak with (clinical staff, provider,  specific staff member): CLINICAL IF NEEDED   HE STATES HE NEEDS A PRE APPROVAL FOR MEDICATIONS   INHALER AND ANOTHER ONE THAT WAS RECENTLY SENT OVER TO THE PHARMACY     Do you know the name of the person who called: CLINICAL     What was the call regarding: PRE APPROVAL FOR MEDICATIONS     Do you require a callback: NOT NEEDED

## 2021-05-11 RX ORDER — FUROSEMIDE 20 MG/1
TABLET ORAL
Qty: 30 TABLET | Refills: 0 | OUTPATIENT
Start: 2021-05-11

## 2021-05-26 ENCOUNTER — HOSPITAL ENCOUNTER (OUTPATIENT)
Dept: CT IMAGING | Facility: HOSPITAL | Age: 61
End: 2021-05-26

## 2021-05-27 ENCOUNTER — TELEPHONE (OUTPATIENT)
Dept: INTERNAL MEDICINE | Facility: CLINIC | Age: 61
End: 2021-05-27

## 2021-05-27 RX ORDER — ONDANSETRON 4 MG/1
4 TABLET, ORALLY DISINTEGRATING ORAL EVERY 8 HOURS PRN
Qty: 30 TABLET | Refills: 3 | Status: CANCELLED | OUTPATIENT
Start: 2021-05-27

## 2021-05-27 RX ORDER — ONDANSETRON 4 MG/1
4 TABLET, ORALLY DISINTEGRATING ORAL EVERY 8 HOURS PRN
Qty: 30 TABLET | Refills: 3 | Status: SHIPPED | OUTPATIENT
Start: 2021-05-27 | End: 2021-08-11 | Stop reason: SDUPTHER

## 2021-05-27 NOTE — TELEPHONE ENCOUNTER
Caller: DarbyJalil    Relationship: Self    Best call back number: [phone]     Medication needed:   Requested Prescriptions     Pending Prescriptions Disp Refills   • ondansetron ODT (Zofran ODT) 4 MG disintegrating tablet 30 tablet 3     Sig: Place 1 tablet on the tongue Every 8 (Eight) Hours As Needed for Nausea or Vomiting.       When do you need the refill by: 05/27/21     What additional details did the patient provide when requesting the medication: PATIENT  STATED THAT HE IS REALLY NAUSEA.    Does the patient have less than a 3 day supply:  [x] Yes  [] No    What is the patient's preferred pharmacy: [facility] #[id number] - [place], [place] - [address] [phone] [facility] [phone]

## 2021-06-02 ENCOUNTER — OFFICE VISIT (OUTPATIENT)
Dept: INTERNAL MEDICINE | Facility: CLINIC | Age: 61
End: 2021-06-02

## 2021-06-02 VITALS
HEART RATE: 91 BPM | WEIGHT: 210 LBS | RESPIRATION RATE: 19 BRPM | DIASTOLIC BLOOD PRESSURE: 78 MMHG | BODY MASS INDEX: 31.1 KG/M2 | HEIGHT: 69 IN | OXYGEN SATURATION: 99 % | SYSTOLIC BLOOD PRESSURE: 140 MMHG | TEMPERATURE: 98.7 F

## 2021-06-02 DIAGNOSIS — M25.50 ARTHRALGIA OF MULTIPLE SITES, BILATERAL: ICD-10-CM

## 2021-06-02 DIAGNOSIS — I25.10 CARDIOVASCULAR DISEASE: ICD-10-CM

## 2021-06-02 DIAGNOSIS — M25.542 JOINT PAIN IN BOTH HANDS: Primary | ICD-10-CM

## 2021-06-02 DIAGNOSIS — M25.50 ARTHRALGIA, UNSPECIFIED JOINT: ICD-10-CM

## 2021-06-02 DIAGNOSIS — M25.40 JOINT SWELLING: ICD-10-CM

## 2021-06-02 DIAGNOSIS — Z00.00 MEDICARE ANNUAL WELLNESS VISIT, INITIAL: Primary | ICD-10-CM

## 2021-06-02 DIAGNOSIS — M25.541 JOINT PAIN IN BOTH HANDS: Primary | ICD-10-CM

## 2021-06-02 PROCEDURE — 1170F FXNL STATUS ASSESSED: CPT | Performed by: FAMILY MEDICINE

## 2021-06-02 PROCEDURE — G0439 PPPS, SUBSEQ VISIT: HCPCS | Performed by: FAMILY MEDICINE

## 2021-06-02 PROCEDURE — 99213 OFFICE O/P EST LOW 20 MIN: CPT | Performed by: FAMILY MEDICINE

## 2021-06-02 PROCEDURE — 1159F MED LIST DOCD IN RCRD: CPT | Performed by: FAMILY MEDICINE

## 2021-06-02 PROCEDURE — 1125F AMNT PAIN NOTED PAIN PRSNT: CPT | Performed by: FAMILY MEDICINE

## 2021-06-02 RX ORDER — ALBUTEROL SULFATE 90 UG/1
AEROSOL, METERED RESPIRATORY (INHALATION)
COMMUNITY
Start: 2021-05-07 | End: 2021-08-11 | Stop reason: SDUPTHER

## 2021-06-02 NOTE — PROGRESS NOTES
The ABCs of the Annual Wellness Visit  Subsequent Medicare Wellness Visit    Chief Complaint   Patient presents with   • Medicare Wellness-Initial Visit       Subjective   History of Present Illness:  Jalil Darby is a 61 y.o. male who presents for a Subsequent Medicare Wellness Visit.    HEALTH RISK ASSESSMENT    Recent Hospitalizations:  No hospitalization(s) within the last year.    Current Medical Providers:  Patient Care Team:  Sravanthi Oliveros DO as PCP - General (Family Medicine)    Smoking Status:  Social History     Tobacco Use   Smoking Status Current Every Day Smoker   • Packs/day: 2.00   • Years: 10.00   • Pack years: 20.00   • Types: Cigarettes   Smokeless Tobacco Never Used       Alcohol Consumption:  Social History     Substance and Sexual Activity   Alcohol Use Never       Depression Screen:   PHQ-2/PHQ-9 Depression Screening 6/2/2021   Little interest or pleasure in doing things 0   Feeling down, depressed, or hopeless 0   Total Score 0       Fall Risk Screen:  STEADI Fall Risk Assessment has not been completed.    Health Habits and Functional and Cognitive Screening:  Functional & Cognitive Status 6/2/2021   Do you have difficulty preparing food and eating? No   Do you have difficulty bathing yourself, getting dressed or grooming yourself? No   Do you have difficulty using the toilet? Yes   Do you have difficulty moving around from place to place? Yes   Do you have trouble with steps or getting out of a bed or a chair? Yes   Current Diet Well Balanced Diet   Dental Exam Up to date   Eye Exam Up to date   Exercise (times per week) 2 times per week   Current Exercise Activities Include Walking   Do you need help using the phone?  No   Are you deaf or do you have serious difficulty hearing?  Yes   Do you need help with transportation? No   Do you need help shopping? No   Do you need help preparing meals?  No   Do you need help with housework?  No   Do you need help with laundry? No   Do you need  help taking your medications? No   Do you need help managing money? No   Do you ever drive or ride in a car without wearing a seat belt? No         Does the patient have evidence of cognitive impairment? No    Asprin use counseling:Start ASA 81 mg daily     Age-appropriate Screening Schedule:  Refer to the list below for future screening recommendations based on patient's age, sex and/or medical conditions. Orders for these recommended tests are listed in the plan section. The patient has been provided with a written plan.    Health Maintenance   Topic Date Due   • TDAP/TD VACCINES (1 - Tdap) Never done   • ZOSTER VACCINE (1 of 2) 03/19/2022 (Originally 1/7/2008)   • INFLUENZA VACCINE  08/01/2021          The following portions of the patient's history were reviewed and updated as appropriate: allergies, current medications, past family history, past medical history, past social history, past surgical history and problem list.    Outpatient Medications Prior to Visit   Medication Sig Dispense Refill   • albuterol sulfate  (90 Base) MCG/ACT inhaler      • aspirin 81 MG chewable tablet Chew 81 mg Daily.     • doxycycline (MONODOX) 100 MG capsule Take 1 capsule by mouth 2 (Two) Times a Day. 20 capsule 0   • furosemide (Lasix) 20 MG tablet Take 1 tablet by mouth Daily. 30 tablet 0   • gabapentin (NEURONTIN) 300 MG capsule Take 2 capsules by mouth 4 (Four) Times a Day for 240 days. 240 capsule 7   • metoprolol tartrate (LOPRESSOR) 25 MG tablet TAKE 1 TABLET BY MOUTH TWICE A  tablet 1   • ondansetron ODT (Zofran ODT) 4 MG disintegrating tablet Place 1 tablet on the tongue Every 8 (Eight) Hours As Needed for Nausea or Vomiting. 30 tablet 3     No facility-administered medications prior to visit.       Patient Active Problem List   Diagnosis   • Tobacco abuse   • Cardiovascular disease       Advanced Care Planning:  ACP discussion was declined by the patient. Patient does not have an advance directive,  "declines further assistance.    Review of Systems    Compared to one year ago, the patient feels his physical health is the same.  Compared to one year ago, the patient feels his mental health is the same.    Reviewed chart for potential of high risk medication in the elderly: yes  Reviewed chart for potential of harmful drug interactions in the elderly:yes    Objective         Vitals:    06/02/21 1458   BP: 140/78   BP Location: Left arm   Patient Position: Sitting   Cuff Size: Adult   Pulse: 91   Resp: 19   Temp: 98.7 °F (37.1 °C)   TempSrc: Skin   SpO2: 99%   Weight: 95.3 kg (210 lb)   Height: 175.3 cm (69\")       Body mass index is 31.01 kg/m².  Discussed the patient's BMI with him. The BMI is above average; BMI management plan is completed.    Physical Exam  Vitals and nursing note reviewed.   Constitutional:       General: He is not in acute distress.     Appearance: Normal appearance. He is obese. He is not ill-appearing.   HENT:      Head: Normocephalic and atraumatic.      Right Ear: Tympanic membrane, ear canal and external ear normal.      Left Ear: Tympanic membrane, ear canal and external ear normal.      Nose: Nose normal.      Mouth/Throat:      Mouth: Mucous membranes are moist.      Pharynx: Oropharynx is clear. No oropharyngeal exudate.   Eyes:      General: No scleral icterus.     Extraocular Movements: Extraocular movements intact.      Conjunctiva/sclera: Conjunctivae normal.      Pupils: Pupils are equal, round, and reactive to light.   Cardiovascular:      Rate and Rhythm: Normal rate and regular rhythm.      Pulses: Normal pulses.      Heart sounds: Normal heart sounds.   Pulmonary:      Effort: Pulmonary effort is normal.      Breath sounds: Normal breath sounds.   Abdominal:      General: Bowel sounds are normal.      Palpations: Abdomen is soft.   Musculoskeletal:         General: Swelling ( PIP, metacarpal and carpal region swelling) and tenderness ( mp and pip joints as well as carpal " region with marked ttp and heat.) present.      Cervical back: Normal range of motion and neck supple.   Skin:     General: Skin is warm and dry.      Capillary Refill: Capillary refill takes less than 2 seconds.   Neurological:      General: No focal deficit present.      Mental Status: He is alert and oriented to person, place, and time.   Psychiatric:         Mood and Affect: Mood normal.         Behavior: Behavior normal.         Thought Content: Thought content normal.         Judgment: Judgment normal.         Lab Results   Component Value Date     (H) 03/19/2021        Assessment/Plan   Medicare Risks and Personalized Health Plan  CMS Preventative Services Quick Reference  Advance Directive Discussion  Cardiovascular risk  Chronic Pain   Fall Risk  Obesity/Overweight     The above risks/problems have been discussed with the patient.  Pertinent information has been shared with the patient in the After Visit Summary.  Follow up plans and orders are seen below in the Assessment/Plan Section.    Diagnoses and all orders for this visit:    1. Medicare annual wellness visit, initial (Primary)  Follow up annually for medicare wellness visit.    2. Arthralgia, unspecified joint  -     C-reactive protein  -     Rheumatoid Factor, Quant  -     Cyclic Citrul Peptide Antibody, IgG / IgA  -     CBC w AUTO Differential  -     Comprehensive metabolic panel  -     Uric acid    3. Joint swelling  -     C-reactive protein  -     Rheumatoid Factor, Quant  -     Cyclic Citrul Peptide Antibody, IgG / IgA  -     CBC w AUTO Differential  -     Comprehensive metabolic panel  -     Uric acid      Follow Up:  Return in about 3 months (around 9/2/2021).     An After Visit Summary and PPPS were given to the patient.

## 2021-06-03 NOTE — PROGRESS NOTES
Subjective     CC:  Joint pain stiffness    History of Present Illness  Having increased problems with joint pain and swelling.   He does better if he stays up and active.   When he sits or lies down the pain is much worse.  He notes he is getting up multiple times during the night.     Patient's PMR from outside medical facility reviewed and noted.    Review of Systems       Otherwise complete ROS reviewed and negative except as mentioned in the HPI.    Past Medical History:   Past Medical History:   Diagnosis Date   • Coronary artery disease    • Degenerative arthritis    • Diverticulosis    • Hx of heart artery stent      Past Surgical History:  Past Surgical History:   Procedure Laterality Date   • APPENDECTOMY     • CARDIAC CATHETERIZATION     • KNEE ARTHROPLASTY Right    • PERIPHERAL ARTERIAL STENT GRAFT     • TOTAL HIP ARTHROPLASTY Right      Social History:  reports that he has been smoking cigarettes. He has a 20.00 pack-year smoking history. He has never used smokeless tobacco. He reports that he does not drink alcohol and does not use drugs.    Family History: family history includes Diabetes in his mother; Heart disease in his father and mother.         Allergies:  Allergies   Allergen Reactions   • Contrast Dye Hives   • Levaquin [Levofloxacin] Nausea Only, Swelling, Dizziness and Angioedema     Medications:  Prior to Admission medications    Medication Sig Start Date End Date Taking? Authorizing Provider   albuterol sulfate  (90 Base) MCG/ACT inhaler  5/7/21   Provider, MD [facility]   aspirin 81 MG chewable tablet Chew 81 mg Daily.    Provider, MD [facility]   doxycycline (MONODOX) 100 MG capsule Take 1 capsule by mouth 2 (Two) Times a Day. 4/28/21   Sravanthi Oliveros DO   furosemide (Lasix) 20 MG tablet Take 1 tablet by mouth Daily. 4/28/21   Sravanthi Oliveros DO   gabapentin (NEURONTIN) 300 MG capsule Take 2 capsules by mouth 4 (Four) Times a Day for 240 days. 4/28/21 12/24/21   Sravanthi Oliveros,    metoprolol tartrate (LOPRESSOR) 25 MG tablet TAKE 1 TABLET BY MOUTH TWICE A DAY 5/11/21   Lisy Melendrez APRN   ondansetron ODT (Zofran ODT) 4 MG disintegrating tablet Place 1 tablet on the tongue Every 8 (Eight) Hours As Needed for Nausea or Vomiting. 5/27/21   Sravanthi Oliveros DO       Objective     Vital Signs  T 98.7 P 91 R 19 /78  Wt 210 lbs  Physical Exam  Vitals and nursing note reviewed.   Constitutional:       Appearance: Normal appearance.   HENT:      Head: Normocephalic and atraumatic.      Right Ear: External ear normal.      Left Ear: External ear normal.      Nose: Nose normal.      Mouth/Throat:      Mouth: Mucous membranes are moist.      Pharynx: Oropharynx is clear.   Eyes:      Extraocular Movements: Extraocular movements intact.      Conjunctiva/sclera: Conjunctivae normal.      Pupils: Pupils are equal, round, and reactive to light.   Cardiovascular:      Rate and Rhythm: Normal rate and regular rhythm.      Pulses: Normal pulses.      Heart sounds: Normal heart sounds.   Pulmonary:      Effort: Pulmonary effort is normal.      Breath sounds: Normal breath sounds.   Abdominal:      General: Bowel sounds are normal.      Palpations: Abdomen is soft.   Musculoskeletal:      Cervical back: Normal range of motion.      Comments: Wrist, fingers swelling with pain over the PIP, MP and carpal bones    Marked ttp        Skin:     General: Skin is warm and dry.      Capillary Refill: Capillary refill takes less than 2 seconds.   Neurological:      General: No focal deficit present.      Mental Status: He is alert and oriented to person, place, and time.   Psychiatric:         Mood and Affect: Mood normal.         Behavior: Behavior normal.         Thought Content: Thought content normal.         Judgment: Judgment normal.                 Results Reviewed:  Glucose   Date Value Ref Range Status   06/07/2020 127 (H) 65 - 99 mg/dL Final     BUN   Date Value  Ref Range Status   03/19/2021 20 8 - 27 mg/dL Final   06/07/2020 14 8 - 23 mg/dL Final   06/24/2019 11 7 - 18 mg/dL Final     Creatinine   Date Value Ref Range Status   03/19/2021 1.24 0.76 - 1.27 mg/dL Final   06/07/2020 1.01 0.76 - 1.27 mg/dL Final   06/24/2019 1.10 0.60 - 1.30 mg/dL Final     Sodium   Date Value Ref Range Status   03/19/2021 136 134 - 144 mmol/L Final   06/07/2020 138 136 - 145 mmol/L Final   06/24/2019 138 135 - 145 mmol/L Final     Potassium   Date Value Ref Range Status   03/19/2021 4.7 3.5 - 5.2 mmol/L Final   06/07/2020 5.1 3.5 - 5.2 mmol/L Final   06/24/2019 4.6 3.5 - 5.0 mmol/L Final     Chloride   Date Value Ref Range Status   03/19/2021 101 96 - 106 mmol/L Final   06/07/2020 101 98 - 107 mmol/L Final   06/24/2019 101 98 - 107 mmol/L Final     CO2   Date Value Ref Range Status   06/07/2020 25.0 22.0 - 29.0 mmol/L Final     Total CO2   Date Value Ref Range Status   03/19/2021 22 20 - 29 mmol/L Final   06/24/2019 26 21 - 32 mmol/L Final     Calcium   Date Value Ref Range Status   03/19/2021 9.0 8.6 - 10.2 mg/dL Final   06/07/2020 9.2 8.6 - 10.5 mg/dL Final   06/24/2019 8.9 8.5 - 10.1 mg/dL Final     Comment:       Calcium measurements are adversely affected by the use of Omniscan during MRI. Analysis of calcium is not recommended for 12 to 24 hours after the use of the contrast agent.      ALT (SGPT)   Date Value Ref Range Status   03/19/2021 25 0 - 44 IU/L Final   06/07/2020 20 1 - 41 U/L Final   06/24/2019 33 16 - 62 U/L Final     AST (SGOT)   Date Value Ref Range Status   03/19/2021 28 0 - 40 IU/L Final   06/07/2020 18 1 - 40 U/L Final     Comment:     Specimen hemolyzed.  Results may be affected.   06/24/2019 18 7 - 34 U/L Final     WBC   Date Value Ref Range Status   03/16/2021 7.88 3.40 - 10.80 10*3/mm3 Final   06/24/2019 8.7 5.0 - 10.0 K/uL Final     Hematocrit   Date Value Ref Range Status   03/16/2021 41.8 37.5 - 51.0 % Final   06/24/2019 47.6 40.0 - 54.0 % Final     Platelets    Date Value Ref Range Status   03/16/2021 357 140 - 450 10*3/mm3 Final   06/24/2019 314 150 - 500 K/uL Final     Total Cholesterol   Date Value Ref Range Status   09/30/2018 190 130 - 200 mg/dL Final     Triglycerides   Date Value Ref Range Status   09/30/2018 244 (H) 0 - 149 mg/dL Final     HDL Cholesterol   Date Value Ref Range Status   09/30/2018 33 (L) >=40 mg/dL Final     LDL Cholesterol    Date Value Ref Range Status   09/30/2018 130 (H) 0 - 99 mg/dL Final     LDL/HDL Ratio   Date Value Ref Range Status   09/30/2018 3.28  Final     Hemoglobin A1C   Date Value Ref Range Status   09/30/2018 6.1 % Final         Assessment / Plan     Assessment/Plan:  1. Joint pain in both hands  Check auto immune lab work as ordered    2. Arthralgia of multiple sites, bilateral      3. Cardiovascular disease    Referral to rheumatology      Return in about 3 months (around 9/2/2021). unless patient needs to be seen sooner or acute issues arise.      I have discussed the patient results/orders and and plan/recommendation with them at today's visit.      Sravanthi Oliveros,    06/02/2021

## 2021-06-05 LAB
ALBUMIN SERPL-MCNC: 4.1 G/DL (ref 3.8–4.8)
ALBUMIN/GLOB SERPL: 1.6 {RATIO} (ref 1.2–2.2)
ALP SERPL-CCNC: 123 IU/L (ref 48–121)
ALT SERPL-CCNC: 17 IU/L (ref 0–44)
AST SERPL-CCNC: 17 IU/L (ref 0–40)
BASOPHILS # BLD AUTO: 0.1 X10E3/UL (ref 0–0.2)
BASOPHILS NFR BLD AUTO: 1 %
BILIRUB SERPL-MCNC: 0.4 MG/DL (ref 0–1.2)
BUN SERPL-MCNC: 14 MG/DL (ref 8–27)
BUN/CREAT SERPL: 12 (ref 10–24)
CALCIUM SERPL-MCNC: 8.6 MG/DL (ref 8.6–10.2)
CCP IGA+IGG SERPL IA-ACNC: 6 UNITS (ref 0–19)
CHLORIDE SERPL-SCNC: 98 MMOL/L (ref 96–106)
CO2 SERPL-SCNC: 23 MMOL/L (ref 20–29)
CREAT SERPL-MCNC: 1.2 MG/DL (ref 0.76–1.27)
CRP SERPL-MCNC: 39 MG/L (ref 0–10)
EOSINOPHIL # BLD AUTO: 0.4 X10E3/UL (ref 0–0.4)
EOSINOPHIL NFR BLD AUTO: 3 %
ERYTHROCYTE [DISTWIDTH] IN BLOOD BY AUTOMATED COUNT: 13.9 % (ref 11.6–15.4)
GLOBULIN SER CALC-MCNC: 2.6 G/DL (ref 1.5–4.5)
GLUCOSE SERPL-MCNC: 100 MG/DL (ref 65–99)
HCT VFR BLD AUTO: 40 % (ref 37.5–51)
HGB BLD-MCNC: 13.1 G/DL (ref 13–17.7)
IMM GRANULOCYTES # BLD AUTO: 0 X10E3/UL (ref 0–0.1)
IMM GRANULOCYTES NFR BLD AUTO: 0 %
LYMPHOCYTES # BLD AUTO: 2.2 X10E3/UL (ref 0.7–3.1)
LYMPHOCYTES NFR BLD AUTO: 19 %
MCH RBC QN AUTO: 30 PG (ref 26.6–33)
MCHC RBC AUTO-ENTMCNC: 32.8 G/DL (ref 31.5–35.7)
MCV RBC AUTO: 92 FL (ref 79–97)
MONOCYTES # BLD AUTO: 0.9 X10E3/UL (ref 0.1–0.9)
MONOCYTES NFR BLD AUTO: 8 %
NEUTROPHILS # BLD AUTO: 7.8 X10E3/UL (ref 1.4–7)
NEUTROPHILS NFR BLD AUTO: 69 %
PLATELET # BLD AUTO: 252 X10E3/UL (ref 150–450)
POTASSIUM SERPL-SCNC: 4.8 MMOL/L (ref 3.5–5.2)
PROT SERPL-MCNC: 6.7 G/DL (ref 6–8.5)
RBC # BLD AUTO: 4.37 X10E6/UL (ref 4.14–5.8)
RHEUMATOID FACT SERPL-ACNC: <10 IU/ML (ref 0–13.9)
SODIUM SERPL-SCNC: 137 MMOL/L (ref 134–144)
URATE SERPL-MCNC: 7.2 MG/DL (ref 3.8–8.4)
WBC # BLD AUTO: 11.3 X10E3/UL (ref 3.4–10.8)

## 2021-06-16 ENCOUNTER — OFFICE VISIT (OUTPATIENT)
Dept: INTERNAL MEDICINE | Facility: CLINIC | Age: 61
End: 2021-06-16

## 2021-06-16 ENCOUNTER — TELEPHONE (OUTPATIENT)
Dept: INTERNAL MEDICINE | Facility: CLINIC | Age: 61
End: 2021-06-16

## 2021-06-16 VITALS
DIASTOLIC BLOOD PRESSURE: 75 MMHG | HEART RATE: 71 BPM | OXYGEN SATURATION: 99 % | BODY MASS INDEX: 29.92 KG/M2 | RESPIRATION RATE: 16 BRPM | SYSTOLIC BLOOD PRESSURE: 125 MMHG | TEMPERATURE: 98 F | HEIGHT: 69 IN | WEIGHT: 202 LBS

## 2021-06-16 DIAGNOSIS — L03.115 CELLULITIS OF RIGHT LOWER EXTREMITY: Primary | ICD-10-CM

## 2021-06-16 DIAGNOSIS — B37.0 THRUSH, ORAL: ICD-10-CM

## 2021-06-16 DIAGNOSIS — I77.6 VASCULITIS (HCC): ICD-10-CM

## 2021-06-16 DIAGNOSIS — B35.3 TINEA PEDIS OF BOTH FEET: ICD-10-CM

## 2021-06-16 DIAGNOSIS — B35.1 TOENAIL FUNGUS: ICD-10-CM

## 2021-06-16 PROCEDURE — 99214 OFFICE O/P EST MOD 30 MIN: CPT | Performed by: FAMILY MEDICINE

## 2021-06-16 RX ORDER — PREDNISONE 10 MG/1
TABLET ORAL
Qty: 74 TABLET | Refills: 0 | Status: SHIPPED | OUTPATIENT
Start: 2021-06-16 | End: 2021-08-25

## 2021-06-16 RX ORDER — FLUCONAZOLE 200 MG/1
200 TABLET ORAL DAILY
Qty: 6 TABLET | Refills: 0 | Status: SHIPPED | OUTPATIENT
Start: 2021-06-16 | End: 2022-02-16

## 2021-06-16 RX ORDER — CEFDINIR 300 MG/1
300 CAPSULE ORAL 2 TIMES DAILY
Qty: 20 CAPSULE | Refills: 0 | Status: SHIPPED | OUTPATIENT
Start: 2021-06-16 | End: 2021-08-25

## 2021-06-16 NOTE — TELEPHONE ENCOUNTER
----- Message from Sravanthi Oliveros DO sent at 6/16/2021  8:08 AM CDT -----  Crp is elevated  Rheumatoid labs negative.     Recommend eval by rheumatology

## 2021-06-16 NOTE — PROGRESS NOTES
Subjective     Chief Complaint   Patient presents with   • Rash     swelling in leg, very itchy, started about a week ago       History of Present Illness  Patient presents with complaints of right lower extremity itching/pain.  Has been red and swollen.  Onset was approximately a week ago.  It is so intense she is unable to tolerate it anymore.  There is some mild erythema on the left leg to any has a purpura type lesion on this leg also.  He is also having problems with thrush.  He is also having problems with marked toenail fungus and tinea pedis.  Patient's PMR from outside medical facility reviewed and noted.    Review of Systems   Otherwise complete ROS reviewed and negative except as mentioned in the HPI.    Past Medical History:   Past Medical History:   Diagnosis Date   • Coronary artery disease    • Degenerative arthritis    • Diverticulosis    • Hx of heart artery stent      Past Surgical History:  Past Surgical History:   Procedure Laterality Date   • APPENDECTOMY     • CARDIAC CATHETERIZATION     • KNEE ARTHROPLASTY Right    • PERIPHERAL ARTERIAL STENT GRAFT     • TOTAL HIP ARTHROPLASTY Right      Social History:  reports that he has been smoking cigarettes. He has a 20.00 pack-year smoking history. He has never used smokeless tobacco. He reports that he does not drink alcohol and does not use drugs.    Family History: family history includes Diabetes in his mother; Heart disease in his father and mother.     Allergies:  Allergies   Allergen Reactions   • Contrast Dye Hives   • Levaquin [Levofloxacin] Nausea Only, Swelling, Dizziness and Angioedema     Medications:  Prior to Admission medications    Medication Sig Start Date End Date Taking? Authorizing Provider   albuterol sulfate  (90 Base) MCG/ACT inhaler  5/7/21  Yes ProviderJaguar MD   aspirin 81 MG chewable tablet Chew 81 mg Daily.   Yes Provider, MD Jaguar   furosemide (Lasix) 20 MG tablet Take 1 tablet by mouth Daily.  "4/28/21  Yes Sravanthi Oliveros DO   gabapentin (NEURONTIN) 300 MG capsule Take 2 capsules by mouth 4 (Four) Times a Day for 240 days. 4/28/21 12/24/21 Yes Sravanthi Oliveros DO   metoprolol tartrate (LOPRESSOR) 25 MG tablet TAKE 1 TABLET BY MOUTH TWICE A DAY 5/11/21  Yes Lisy Melendrez APRN   ondansetron ODT (Zofran ODT) 4 MG disintegrating tablet Place 1 tablet on the tongue Every 8 (Eight) Hours As Needed for Nausea or Vomiting. 5/27/21  Yes Sravanthi Oliveros DO   cefdinir (OMNICEF) 300 MG capsule Take 1 capsule by mouth 2 (Two) Times a Day. 6/16/21   Sravanthi Oliveros DO           fluconazole (Diflucan) 200 MG tablet Take 1 tablet by mouth Daily. 6/16/21   Sravanthi Oliveros DO   nystatin (MYCOSTATIN) 063415 UNIT/ML suspension Swish and swallow 5 mL 4 (Four) Times a Day. 6/16/21   Sravanthi Oliveros DO   predniSONE (DELTASONE) 10 MG tablet Take 4 daily x 1  Week then 3 daily x 1 week then 2 daily x 1 week then 1 daily x one week then 1/2 daily x 1 week then stop. 6/16/21   Sravanthi Oliveros DO       Objective     Vital Signs: /75 (BP Location: Left arm, Patient Position: Sitting, Cuff Size: Adult)   Pulse 71   Temp 98 °F (36.7 °C) (Skin)   Resp 16   Ht 175.3 cm (69.02\")   Wt 91.6 kg (202 lb)   SpO2 99%   BMI 29.82 kg/m²   Physical Exam  Vitals and nursing note reviewed.   Constitutional:       Appearance: Normal appearance.   HENT:      Head: Normocephalic and atraumatic.      Right Ear: External ear normal.      Left Ear: External ear normal.      Nose: Nose normal.      Mouth/Throat:      Mouth: Mucous membranes are moist.      Comments: White patchy lesions consistent with thrush  Eyes:      Extraocular Movements: Extraocular movements intact.      Conjunctiva/sclera: Conjunctivae normal.      Pupils: Pupils are equal, round, and reactive to light.   Cardiovascular:      Rate and Rhythm: Normal rate and regular rhythm.      Pulses: Normal pulses.      Heart sounds: " Normal heart sounds.   Pulmonary:      Effort: Pulmonary effort is normal.      Breath sounds: Normal breath sounds.   Abdominal:      General: Bowel sounds are normal.      Palpations: Abdomen is soft.   Musculoskeletal:         General: Normal range of motion.      Cervical back: Normal range of motion.   Skin:     General: Skin is warm and dry.      Capillary Refill: Capillary refill takes less than 2 seconds.      Comments: Right lower extremity has marked erythema from ankle to the mid calf region encircling the leg it is indurated it is warm to touch it is tender to touch there is no open lesion or drainage.  Lower extremity has a little bit of erythema medially he also has a what appears to be a purpural lesion just below the medial malleolus.    Skin on on the feet with cracking consistent with tinea pedis.  Marked fungal changes of the toenails bilateral feet inclusive of all 5.   Neurological:      General: No focal deficit present.      Mental Status: He is alert.   Psychiatric:         Mood and Affect: Mood normal.         Patient's Body mass index is 29.82 kg/m². indicating that he is overweight (BMI 25-29.9). Obesity-related health conditions include the following: hypertension. Obesity is unchanged. BMI is is above average; BMI management plan is completed.              Results Reviewed:  Glucose   Date Value Ref Range Status   06/07/2020 127 (H) 65 - 99 mg/dL Final     BUN   Date Value Ref Range Status   06/02/2021 14 8 - 27 mg/dL Final   06/07/2020 14 8 - 23 mg/dL Final   06/24/2019 11 7 - 18 mg/dL Final     Creatinine   Date Value Ref Range Status   06/02/2021 1.20 0.76 - 1.27 mg/dL Final   06/07/2020 1.01 0.76 - 1.27 mg/dL Final   06/24/2019 1.10 0.60 - 1.30 mg/dL Final     Sodium   Date Value Ref Range Status   06/02/2021 137 134 - 144 mmol/L Final   06/07/2020 138 136 - 145 mmol/L Final   06/24/2019 138 135 - 145 mmol/L Final     Potassium   Date Value Ref Range Status   06/02/2021 4.8 3.5 - 5.2  mmol/L Final   06/07/2020 5.1 3.5 - 5.2 mmol/L Final   06/24/2019 4.6 3.5 - 5.0 mmol/L Final     Chloride   Date Value Ref Range Status   06/02/2021 98 96 - 106 mmol/L Final   06/07/2020 101 98 - 107 mmol/L Final   06/24/2019 101 98 - 107 mmol/L Final     CO2   Date Value Ref Range Status   06/07/2020 25.0 22.0 - 29.0 mmol/L Final     Total CO2   Date Value Ref Range Status   06/02/2021 23 20 - 29 mmol/L Final   06/24/2019 26 21 - 32 mmol/L Final     Calcium   Date Value Ref Range Status   06/02/2021 8.6 8.6 - 10.2 mg/dL Final   06/07/2020 9.2 8.6 - 10.5 mg/dL Final   06/24/2019 8.9 8.5 - 10.1 mg/dL Final     Comment:       Calcium measurements are adversely affected by the use of Omniscan during MRI. Analysis of calcium is not recommended for 12 to 24 hours after the use of the contrast agent.      ALT (SGPT)   Date Value Ref Range Status   06/02/2021 17 0 - 44 IU/L Final   06/07/2020 20 1 - 41 U/L Final   06/24/2019 33 16 - 62 U/L Final     AST (SGOT)   Date Value Ref Range Status   06/02/2021 17 0 - 40 IU/L Final   06/07/2020 18 1 - 40 U/L Final     Comment:     Specimen hemolyzed.  Results may be affected.   06/24/2019 18 7 - 34 U/L Final     WBC   Date Value Ref Range Status   06/02/2021 11.3 (H) 3.4 - 10.8 x10E3/uL Final   06/24/2019 8.7 5.0 - 10.0 K/uL Final     Hematocrit   Date Value Ref Range Status   06/02/2021 40.0 37.5 - 51.0 % Final   03/16/2021 41.8 37.5 - 51.0 % Final   06/24/2019 47.6 40.0 - 54.0 % Final     Platelets   Date Value Ref Range Status   06/02/2021 252 150 - 450 x10E3/uL Final   03/16/2021 357 140 - 450 10*3/mm3 Final   06/24/2019 314 150 - 500 K/uL Final     Total Cholesterol   Date Value Ref Range Status   09/30/2018 190 130 - 200 mg/dL Final     Triglycerides   Date Value Ref Range Status   09/30/2018 244 (H) 0 - 149 mg/dL Final     HDL Cholesterol   Date Value Ref Range Status   09/30/2018 33 (L) >=40 mg/dL Final     LDL Cholesterol    Date Value Ref Range Status   09/30/2018 130 (H)  0 - 99 mg/dL Final     LDL/HDL Ratio   Date Value Ref Range Status   09/30/2018 3.28  Final     Hemoglobin A1C   Date Value Ref Range Status   09/30/2018 6.1 % Final         Assessment / Plan     Assessment/Plan:  1. Cellulitis of right lower extremity    - Comprehensive metabolic panel  - C-reactive protein  - CBC w AUTO Differential    2. Vasculitis (CMS/HCC)    - Comprehensive metabolic panel  - C-reactive protein  - CBC w AUTO Differential    3. Thrush, oral  Diflucan 200 mg daily times 3 repeat in 7 days if not clear  Nystatin swish and swallow 4 times daily for 7 days    4. Toenail fungus  Podiatry referral    5. Tinea pedis of both feet          Return in about 1 week (around 6/23/2021). unless patient needs to be seen sooner or acute issues arise.        I have discussed the patient results/orders and and plan/recommendation with them at today's visit.      Sravanthi Oliveros, DO   06/16/2021

## 2021-06-16 NOTE — TELEPHONE ENCOUNTER
Called patient to discuss lab results and recommendations per Dr. Oliveros. He voiced understanding and states he has scheduled a same day appointment to see Dr. Oliveros today and will discuss his options further then.

## 2021-06-17 RX ORDER — FUROSEMIDE 20 MG/1
TABLET ORAL
Qty: 30 TABLET | Refills: 0 | Status: SHIPPED | OUTPATIENT
Start: 2021-06-17 | End: 2021-06-29

## 2021-06-29 RX ORDER — FUROSEMIDE 20 MG/1
TABLET ORAL
Qty: 30 TABLET | Refills: 0 | Status: SHIPPED | OUTPATIENT
Start: 2021-06-29 | End: 2021-08-04

## 2021-06-29 RX ORDER — FLUCONAZOLE 200 MG/1
TABLET ORAL
Qty: 6 TABLET | Refills: 0 | OUTPATIENT
Start: 2021-06-29

## 2021-06-29 RX ORDER — PREDNISONE 10 MG/1
TABLET ORAL
Qty: 74 TABLET | Refills: 0 | OUTPATIENT
Start: 2021-06-29

## 2021-08-04 RX ORDER — FUROSEMIDE 20 MG/1
TABLET ORAL
Qty: 30 TABLET | Refills: 0 | Status: SHIPPED | OUTPATIENT
Start: 2021-08-04 | End: 2021-09-01

## 2021-08-11 RX ORDER — ONDANSETRON 4 MG/1
4 TABLET, ORALLY DISINTEGRATING ORAL EVERY 8 HOURS PRN
Qty: 30 TABLET | Refills: 3 | Status: SHIPPED | OUTPATIENT
Start: 2021-08-11 | End: 2021-08-12 | Stop reason: SDUPTHER

## 2021-08-11 RX ORDER — ALBUTEROL SULFATE 90 UG/1
2 AEROSOL, METERED RESPIRATORY (INHALATION) EVERY 4 HOURS PRN
Qty: 18 G | Refills: 6 | Status: SHIPPED | OUTPATIENT
Start: 2021-08-11 | End: 2021-08-12 | Stop reason: SDUPTHER

## 2021-08-11 NOTE — TELEPHONE ENCOUNTER
Rx Refill Note  Requested Prescriptions     Pending Prescriptions Disp Refills   • albuterol sulfate  (90 Base) MCG/ACT inhaler     • ondansetron ODT (Zofran ODT) 4 MG disintegrating tablet 30 tablet 3     Sig: Place 1 tablet on the tongue Every 8 (Eight) Hours As Needed for Nausea or Vomiting.     Med last filled:    albuterol 5/7/21   zofran 5/27/21  Last office visit with prescribing clinician: 6/16/2021      Next office visit with prescribing clinician: 9/2/2021            Johana Campbell RN  08/11/21, 09:18 CDT

## 2021-08-12 RX ORDER — ONDANSETRON 4 MG/1
4 TABLET, ORALLY DISINTEGRATING ORAL EVERY 8 HOURS PRN
Qty: 30 TABLET | Refills: 3 | Status: SHIPPED | OUTPATIENT
Start: 2021-08-12 | End: 2021-10-27

## 2021-08-12 RX ORDER — ALBUTEROL SULFATE 90 UG/1
2 AEROSOL, METERED RESPIRATORY (INHALATION) EVERY 4 HOURS PRN
Qty: 18 G | Refills: 6 | Status: SHIPPED | OUTPATIENT
Start: 2021-08-12 | End: 2022-04-04 | Stop reason: SDUPTHER

## 2021-08-25 ENCOUNTER — HOSPITAL ENCOUNTER (OUTPATIENT)
Dept: CT IMAGING | Facility: HOSPITAL | Age: 61
Discharge: HOME | End: 2021-08-25
Admitting: FAMILY MEDICINE

## 2021-08-25 ENCOUNTER — OFFICE VISIT (OUTPATIENT)
Dept: INTERNAL MEDICINE | Facility: CLINIC | Age: 61
End: 2021-08-25

## 2021-08-25 ENCOUNTER — TELEPHONE (OUTPATIENT)
Dept: INTERNAL MEDICINE | Facility: CLINIC | Age: 61
End: 2021-08-25

## 2021-08-25 VITALS
SYSTOLIC BLOOD PRESSURE: 150 MMHG | HEIGHT: 69 IN | HEART RATE: 89 BPM | OXYGEN SATURATION: 98 % | WEIGHT: 203.4 LBS | TEMPERATURE: 97.9 F | DIASTOLIC BLOOD PRESSURE: 87 MMHG | BODY MASS INDEX: 30.13 KG/M2 | RESPIRATION RATE: 17 BRPM

## 2021-08-25 DIAGNOSIS — R10.32 LEFT LOWER QUADRANT ABDOMINAL PAIN: Primary | ICD-10-CM

## 2021-08-25 DIAGNOSIS — Z72.0 TOBACCO ABUSE: ICD-10-CM

## 2021-08-25 DIAGNOSIS — R33.9 URINARY RETENTION: ICD-10-CM

## 2021-08-25 DIAGNOSIS — R10.32 LEFT LOWER QUADRANT ABDOMINAL PAIN: ICD-10-CM

## 2021-08-25 DIAGNOSIS — R31.9 HEMATURIA, UNSPECIFIED TYPE: ICD-10-CM

## 2021-08-25 DIAGNOSIS — R35.0 FREQUENT URINATION: ICD-10-CM

## 2021-08-25 DIAGNOSIS — R73.09 ELEVATED GLUCOSE: ICD-10-CM

## 2021-08-25 DIAGNOSIS — R10.84 GENERALIZED ABDOMINAL PAIN: Primary | ICD-10-CM

## 2021-08-25 LAB — HBA1C MFR BLD: 6.4 %

## 2021-08-25 PROCEDURE — 74176 CT ABD & PELVIS W/O CONTRAST: CPT

## 2021-08-25 PROCEDURE — 83036 HEMOGLOBIN GLYCOSYLATED A1C: CPT | Performed by: FAMILY MEDICINE

## 2021-08-25 PROCEDURE — 99214 OFFICE O/P EST MOD 30 MIN: CPT | Performed by: FAMILY MEDICINE

## 2021-08-25 RX ORDER — CEFDINIR 300 MG/1
300 CAPSULE ORAL 2 TIMES DAILY
Qty: 20 CAPSULE | Refills: 0 | Status: SHIPPED | OUTPATIENT
Start: 2021-08-25 | End: 2021-09-08 | Stop reason: HOSPADM

## 2021-08-25 RX ORDER — HYDROCODONE BITARTRATE AND ACETAMINOPHEN 5; 325 MG/1; MG/1
1 TABLET ORAL EVERY 6 HOURS PRN
Qty: 30 TABLET | Refills: 0 | Status: SHIPPED | OUTPATIENT
Start: 2021-08-25

## 2021-08-25 RX ORDER — BLOOD-GLUCOSE METER
1 KIT MISCELLANEOUS
Qty: 150 EACH | Refills: 11 | Status: SHIPPED | OUTPATIENT
Start: 2021-08-25

## 2021-08-25 NOTE — PROGRESS NOTES
Subjective    Mr. Darby is 61 y.o. male    Chief Complaint: Urinary retention/Has catheter    History of Present Illness  61-year-old male new patient referred by Dr. Mike for worsening bothersome LUTS and urinary retention for which she had Sellers catheter placed yesterday in the [facility].  He also had CT abdomen pelvis without contrast done 8/25/2021 reviewed by me with the patient today showing an indeterminate left lower pole cystic renal lesion as well as an enlarged prostate.  Kidneys had no stone or hydronephrosis.  He states he has a history of enlarged prostate dating back several years for which he was previously on Avodart and tamsulosin but stopped several years ago.  He states he realizes see does not empty well and needs to have something done about his prostate.  He reports having cystoscopy in the past which was very uncomfortable for him.  He also reports history of gross hematuria.    I independently visualized and reviewed the patient's prior imaging studies today in clinic and discussed the imaging findings with the patient.      The following portions of the patient's history were reviewed and updated as appropriate: allergies, current medications, past family history, past medical history, past social history, past surgical history and problem list.    Review of Systems      Current Outpatient Medications:   •  albuterol sulfate  (90 Base) MCG/ACT inhaler, Inhale 2 puffs Every 4 (Four) Hours As Needed for Wheezing., Disp: 18 g, Rfl: 6  •  aspirin 81 MG chewable tablet, Chew 81 mg Daily., Disp: , Rfl:   •  cefdinir (OMNICEF) 300 MG capsule, Take 1 capsule by mouth 2 (Two) Times a Day., Disp: 20 capsule, Rfl: 0  •  fluconazole (Diflucan) 200 MG tablet, Take 1 tablet by mouth Daily., Disp: 6 tablet, Rfl: 0  •  furosemide (LASIX) 20 MG tablet, TAKE 1 TABLET BY MOUTH EVERY DAY, Disp: 30 tablet, Rfl: 0  •  gabapentin (NEURONTIN) 300 MG capsule, Take 2 capsules by mouth 4 (Four) Times a  Day for 240 days., Disp: 240 capsule, Rfl: 7  •  glucose blood test strip, Use as instructed, Disp: 150 each, Rfl: 11  •  glucose monitor monitoring kit, 1 each 4 (Four) Times a Day Before Meals & at Bedtime., Disp: 150 each, Rfl: 11  •  HYDROcodone-acetaminophen (Norco) 5-325 MG per tablet, Take 1 tablet by mouth Every 6 (Six) Hours As Needed for Moderate Pain ., Disp: 30 tablet, Rfl: 0  •  Lancets 30G misc, 1 each 4 (Four) Times a Day Before Meals & at Bedtime., Disp: 150 each, Rfl: 11  •  metoprolol tartrate (LOPRESSOR) 25 MG tablet, TAKE 1 TABLET BY MOUTH TWICE A DAY, Disp: 180 tablet, Rfl: 1  •  nystatin (MYCOSTATIN) 302015 UNIT/ML suspension, Swish and swallow 5 mL 4 (Four) Times a Day., Disp: 473 mL, Rfl: 1  •  ondansetron ODT (Zofran ODT) 4 MG disintegrating tablet, Place 1 tablet on the tongue Every 8 (Eight) Hours As Needed for Nausea or Vomiting., Disp: 30 tablet, Rfl: 3  •  finasteride (PROSCAR) 5 MG tablet, Take 1 tablet by mouth Daily., Disp: 14 tablet, Rfl: 0    Past Medical History:   Diagnosis Date   • Coronary artery disease    • Degenerative arthritis    • Diverticulosis    • Hx of heart artery stent        Past Surgical History:   Procedure Laterality Date   • APPENDECTOMY     • CARDIAC CATHETERIZATION     • KNEE ARTHROPLASTY Right    • PERIPHERAL ARTERIAL STENT GRAFT     • TOTAL HIP ARTHROPLASTY Right        Social History     Socioeconomic History   • Marital status:      Spouse name: Not on file   • Number of children: Not on file   • Years of education: Not on file   • Highest education level: Not on file   Tobacco Use   • Smoking status: Current Every Day Smoker     Packs/day: 2.00     Years: 10.00     Pack years: 20.00     Types: Cigarettes   • Smokeless tobacco: Never Used   Vaping Use   • Vaping Use: Never used   Substance and Sexual Activity   • Alcohol use: Never   • Drug use: No       Family History   Problem Relation Age of Onset   • Diabetes Mother    • Heart disease Mother   "  • Heart disease Father        Objective    Temp 97.4 °F (36.3 °C)   Ht 177.8 cm (70\")   Wt 92.1 kg (203 lb)   BMI 29.13 kg/m²     Physical Exam        Results for orders placed or performed in visit on 08/25/21   Urine Culture - Urine, Urine, Clean Catch    Specimen: Urine, Clean Catch    URINE  RELEASE TO MAYA   Result Value Ref Range    Urine Culture Final report     Result 1 No growth    POC Glycosylated Hemoglobin (Hb A1C)    Specimen: Blood   Result Value Ref Range    Hemoglobin A1C 6.4 %         Assessment and Plan    Diagnoses and all orders for this visit:    1. Urinary retention (Primary)  -     Cancel: POC Urinalysis Dipstick, Multipro    2. Benign prostatic hyperplasia (BPH) with straining on urination  -     finasteride (PROSCAR) 5 MG tablet; Take 1 tablet by mouth Daily.  Dispense: 14 tablet; Refill: 0  -     Case Request; Standing  -     CBC (No Diff); Future  -     Basic Metabolic Panel; Future  -     gentamicin (GARAMYCIN) 510 mg in sodium chloride 0.9 % 100 mL IVPB  -     Case Request    3. Gross hematuria    Other orders  -     Follow Anesthesia Guidelines / Standing Orders; Future  -     Obtain Informed Consent; Future  -     Provide NPO Instructions to Patient; Future  -     Chlorhexidine Skin Prep; Future  -     Follow Anesthesia Guidelines / Standing Orders; Standing  -     Verify / Perform Chlorhexidine Skin Prep; Standing  -     Verify / Perform Chlorhexidine Skin Prep if Indicated (If Not Already Completed); Standing  -     ECG 12 Lead; Standing      Acute urinary retention with indwelling Sellers catheter.  History of enlarged prostate dating back several years.  History of recent gross hematuria.  We discussed options for management and further evaluation.  I recommended keeping the Sellers catheter in place at this point for bladder rest.  We discussed options for cystoscopy in office to rule out bladder cancer along with possibly providing a trial of void versus leaving the catheter in " versus proceeding with transurethral resection of the prostate.  Patient does not want cystoscopy done in the office.  He would like cystoscopy done at the time of transurethral resection of prostate.  We discussed risks of this procedure including but not limited to infection, bleeding, need for additional procedures, possibility finding bladder cancer and/or other pathology that would require attention, need for postoperative bladder catheter, retrograde ejaculation, injury to urethra under bladder, complications of anesthesia.  Patient voices understanding and provide informed consent to proceed with transurethral resection of prostate 9/7/2021.  He will keep the catheter in place until that time.  He will hold his aspirin preoperatively.  I recommended starting finasteride now preoperatively to help optimize for surgery.

## 2021-08-25 NOTE — PROGRESS NOTES
Subjective     Chief Complaint   Patient presents with   • Difficulty Urinating     Prostate.    • Dizziness     Suspected blood sugar problem.   • Nausea     Suspected blood sugar problem.   • Constipation     Prostate.        History of Present Illness  Wobbly, lightheaded episodically.  Improves if he eats or drinks something.  He is concerned that he has diabetes.  Everybody else in his family does.  Patient has had some issues with nausea.  He is having him some issues with constipation.  He notes that he is having marked issues with urination.  He has trouble starting his stream he has not been able to urinate this morning he is also been having bloody urine.  He is also complaining of left flank and abdominal pain.  It is very significant and markedly uncomfortable for him.  He is worried.  No fever or chills.  Skin on feet has cleared up.  Has not yet seen the podiatrist.  Apparently she has canceled 3 appointments so far that he has made.  Patient's PMR from outside medical facility reviewed and noted.    Review of Systems     Otherwise complete ROS reviewed and negative except as mentioned in the HPI.    Past Medical History:   Past Medical History:   Diagnosis Date   • Coronary artery disease    • Degenerative arthritis    • Diverticulosis    • Hx of heart artery stent      Past Surgical History:  Past Surgical History:   Procedure Laterality Date   • APPENDECTOMY     • CARDIAC CATHETERIZATION     • KNEE ARTHROPLASTY Right    • PERIPHERAL ARTERIAL STENT GRAFT     • TOTAL HIP ARTHROPLASTY Right      Social History:  reports that he has been smoking cigarettes. He has a 20.00 pack-year smoking history. He has never used smokeless tobacco. He reports that he does not drink alcohol and does not use drugs.    Family History: family history includes Diabetes in his mother; Heart disease in his father and mother.       Allergies:  Allergies   Allergen Reactions   • Contrast Dye Hives   • Levaquin  "[Levofloxacin] Nausea Only, Swelling, Dizziness and Angioedema     Medications:  Prior to Admission medications    Medication Sig Start Date End Date Taking? Authorizing Provider   albuterol sulfate  (90 Base) MCG/ACT inhaler Inhale 2 puffs Every 4 (Four) Hours As Needed for Wheezing. 8/12/21  Yes Sravanthi Oliveros DO   aspirin 81 MG chewable tablet Chew 81 mg Daily.   Yes Provider, MD [facility]   fluconazole (Diflucan) 200 MG tablet Take 1 tablet by mouth Daily. 6/16/21  Yes Sravanthi Oliveros DO   furosemide (LASIX) 20 MG tablet TAKE 1 TABLET BY MOUTH EVERY DAY 8/4/21  Yes Sravanthi Oliveros DO   gabapentin (NEURONTIN) 300 MG capsule Take 2 capsules by mouth 4 (Four) Times a Day for 240 days. 4/28/21 12/24/21 Yes Sravanthi Oliveros DO   metoprolol tartrate (LOPRESSOR) 25 MG tablet TAKE 1 TABLET BY MOUTH TWICE A DAY 5/11/21  Yes Lisy Melendrez APRN   nystatin (MYCOSTATIN) 041073 UNIT/ML suspension Swish and swallow 5 mL 4 (Four) Times a Day. 6/16/21  Yes Sravanthi Oliveros DO   ondansetron ODT (Zofran ODT) 4 MG disintegrating tablet Place 1 tablet on the tongue Every 8 (Eight) Hours As Needed for Nausea or Vomiting. 8/12/21  Yes Sravanthi Oliveros DO   cefdinir (OMNICEF) 300 MG capsule Take 1 capsule by mouth 2 (Two) Times a Day. 6/16/21   Sravanthi Oliveros DO   doxycycline (MONODOX) 100 MG capsule Take 1 capsule by mouth 2 (Two) Times a Day. 4/28/21   Sravanthi Oliveros DO   predniSONE (DELTASONE) 10 MG tablet Take 4 daily x 1  Week then 3 daily x 1 week then 2 daily x 1 week then 1 daily x one week then 1/2 daily x 1 week then stop. 6/16/21   Sravanthi Oliveros DO       Objective     Vital Signs: /87 (BP Location: Left arm, Patient Position: Sitting, Cuff Size: Adult)   Pulse 89   Temp 97.9 °F (36.6 °C) (Skin)   Resp 17   Ht 175.3 cm (69\")   Wt 92.3 kg (203 lb 6.4 oz)   SpO2 98%   BMI 30.04 kg/m²   Physical Exam  Vitals and nursing note reviewed. "   Constitutional:       Appearance: Normal appearance. He is obese.   HENT:      Head: Normocephalic and atraumatic.      Right Ear: Tympanic membrane, ear canal and external ear normal.      Left Ear: Tympanic membrane, ear canal and external ear normal.      Nose: Nose normal.      Mouth/Throat:      Mouth: Mucous membranes are moist.      Pharynx: Oropharynx is clear.   Eyes:      Extraocular Movements: Extraocular movements intact.      Conjunctiva/sclera: Conjunctivae normal.      Pupils: Pupils are equal, round, and reactive to light.   Cardiovascular:      Rate and Rhythm: Normal rate and regular rhythm.      Pulses: Normal pulses.      Heart sounds: Normal heart sounds.   Pulmonary:      Effort: Pulmonary effort is normal.      Breath sounds: Normal breath sounds.   Abdominal:      General: Bowel sounds are normal.      Palpations: Abdomen is soft.      Tenderness: There is abdominal tenderness ( Left side of abdomen into the left flank). There is left CVA tenderness.   Musculoskeletal:         General: Normal range of motion.      Cervical back: Normal range of motion and neck supple.      Right lower leg: No edema.      Left lower leg: No edema.   Skin:     General: Skin is warm and dry.      Capillary Refill: Capillary refill takes less than 2 seconds.   Neurological:      General: No focal deficit present.      Mental Status: He is alert and oriented to person, place, and time.   Psychiatric:         Mood and Affect: Mood normal.         Behavior: Behavior normal.         Patient's Body mass index is 30.04 kg/m².     Results Reviewed:  Glucose   Date Value Ref Range Status   06/07/2020 127 (H) 65 - 99 mg/dL Final     BUN   Date Value Ref Range Status   06/02/2021 14 8 - 27 mg/dL Final   06/07/2020 14 8 - 23 mg/dL Final   06/24/2019 11 7 - 18 mg/dL Final     Creatinine   Date Value Ref Range Status   06/02/2021 1.20 0.76 - 1.27 mg/dL Final   06/07/2020 1.01 0.76 - 1.27 mg/dL Final   06/24/2019 1.10 0.60 -  1.30 mg/dL Final     Sodium   Date Value Ref Range Status   06/02/2021 137 134 - 144 mmol/L Final   06/07/2020 138 136 - 145 mmol/L Final   06/24/2019 138 135 - 145 mmol/L Final     Potassium   Date Value Ref Range Status   06/02/2021 4.8 3.5 - 5.2 mmol/L Final   06/07/2020 5.1 3.5 - 5.2 mmol/L Final   06/24/2019 4.6 3.5 - 5.0 mmol/L Final     Chloride   Date Value Ref Range Status   06/02/2021 98 96 - 106 mmol/L Final   06/07/2020 101 98 - 107 mmol/L Final   06/24/2019 101 98 - 107 mmol/L Final     CO2   Date Value Ref Range Status   06/07/2020 25.0 22.0 - 29.0 mmol/L Final     Total CO2   Date Value Ref Range Status   06/02/2021 23 20 - 29 mmol/L Final   06/24/2019 26 21 - 32 mmol/L Final     Calcium   Date Value Ref Range Status   06/02/2021 8.6 8.6 - 10.2 mg/dL Final   06/07/2020 9.2 8.6 - 10.5 mg/dL Final   06/24/2019 8.9 8.5 - 10.1 mg/dL Final     Comment:       Calcium measurements are adversely affected by the use of Omniscan during MRI. Analysis of calcium is not recommended for 12 to 24 hours after the use of the contrast agent.      ALT (SGPT)   Date Value Ref Range Status   06/02/2021 17 0 - 44 IU/L Final   06/07/2020 20 1 - 41 U/L Final   06/24/2019 33 16 - 62 U/L Final     AST (SGOT)   Date Value Ref Range Status   06/02/2021 17 0 - 40 IU/L Final   06/07/2020 18 1 - 40 U/L Final     Comment:     Specimen hemolyzed.  Results may be affected.   06/24/2019 18 7 - 34 U/L Final     WBC   Date Value Ref Range Status   06/02/2021 11.3 (H) 3.4 - 10.8 x10E3/uL Final   06/24/2019 8.7 5.0 - 10.0 K/uL Final     Hematocrit   Date Value Ref Range Status   06/02/2021 40.0 37.5 - 51.0 % Final   03/16/2021 41.8 37.5 - 51.0 % Final   06/24/2019 47.6 40.0 - 54.0 % Final     Platelets   Date Value Ref Range Status   06/02/2021 252 150 - 450 x10E3/uL Final   03/16/2021 357 140 - 450 10*3/mm3 Final   06/24/2019 314 150 - 500 K/uL Final     Total Cholesterol   Date Value Ref Range Status   09/30/2018 190 130 - 200 mg/dL  Final     Triglycerides   Date Value Ref Range Status   09/30/2018 244 (H) 0 - 149 mg/dL Final     HDL Cholesterol   Date Value Ref Range Status   09/30/2018 33 (L) >=40 mg/dL Final     LDL Cholesterol    Date Value Ref Range Status   09/30/2018 130 (H) 0 - 99 mg/dL Final     LDL/HDL Ratio   Date Value Ref Range Status   09/30/2018 3.28  Final     Hemoglobin A1C   Date Value Ref Range Status   08/25/2021 6.4 % Final         Assessment / Plan     Assessment/Plan:  1. Left lower quadrant abdominal pain    - US Abdomen Complete; Future    2. Frequent urination    - Ambulatory Referral to Urology    3. Hematuria, unspecified type    - Ambulatory Referral to Urology    4. Elevated glucose    - POC Glycosylated Hemoglobin (Hb A1C)  6.4  Glucometer check blood sugar before meals and at bedtime keep a log.  If having any of the lightheaded episodes would also recommend checking blood sugar at that time.  5. Tobacco abuse  Smoking cessation discussed    6. Urinary retention  Sellers catheter placed.       Return in about 3 weeks (around 9/15/2021), or if symptoms worsen or fail to improve. unless patient needs to be seen sooner or acute issues arise.        I have discussed the patient results/orders and and plan/recommendation with them at today's visit.      Sravanthi Oliveros,    08/25/2021

## 2021-08-25 NOTE — TELEPHONE ENCOUNTER
Caller: Jalil Darby    Relationship: Self    Best call back number: [phone]      What medication are you requesting: pain meds    If a prescription is needed, what is your preferred pharmacy and phone number:   [facility]/pharmacy #[id number] - [place], [place] - [address] - [phone] [facility] [phone] FX     Additional notes: pt said that he is now experiencing pain and discomfort. He is asking for pain mediation to be sent to pharmacy

## 2021-08-25 NOTE — TELEPHONE ENCOUNTER
----- Message from Sravanthi Oliveros DO sent at 8/25/2021  5:04 PM CDT -----  No acute pathology  Left renal lesion that will need rescanned  Fatty liver  Enlarged prostate

## 2021-08-26 ENCOUNTER — OFFICE VISIT (OUTPATIENT)
Dept: UROLOGY | Facility: CLINIC | Age: 61
End: 2021-08-26

## 2021-08-26 VITALS — BODY MASS INDEX: 29.06 KG/M2 | WEIGHT: 203 LBS | HEIGHT: 70 IN | TEMPERATURE: 97.4 F

## 2021-08-26 DIAGNOSIS — N40.1 BENIGN PROSTATIC HYPERPLASIA (BPH) WITH STRAINING ON URINATION: ICD-10-CM

## 2021-08-26 DIAGNOSIS — R31.0 GROSS HEMATURIA: ICD-10-CM

## 2021-08-26 DIAGNOSIS — R33.9 URINARY RETENTION: Primary | ICD-10-CM

## 2021-08-26 DIAGNOSIS — R39.16 BENIGN PROSTATIC HYPERPLASIA (BPH) WITH STRAINING ON URINATION: ICD-10-CM

## 2021-08-26 PROCEDURE — 99204 OFFICE O/P NEW MOD 45 MIN: CPT | Performed by: UROLOGY

## 2021-08-26 RX ORDER — FINASTERIDE 5 MG/1
5 TABLET, FILM COATED ORAL DAILY
Qty: 14 TABLET | Refills: 0 | Status: SHIPPED | OUTPATIENT
Start: 2021-08-26 | End: 2021-09-08 | Stop reason: HOSPADM

## 2021-08-26 NOTE — PATIENT INSTRUCTIONS
"BMI for Adults  What is BMI?  Body mass index (BMI) is a number that is calculated from a person's weight and height. BMI can help estimate how much of a person's weight is composed of fat. BMI does not measure body fat directly. Rather, it is an alternative to procedures that directly measure body fat, which can be difficult and expensive.  BMI can help identify people who may be at higher risk for certain medical problems.  What are BMI measurements used for?  BMI is used as a screening tool to identify possible weight problems. It helps determine whether a person is obese, overweight, a healthy weight, or underweight.  BMI is useful for:  · Identifying a weight problem that may be related to a medical condition or may increase the risk for medical problems.  · Promoting changes, such as changes in diet and exercise, to help reach a healthy weight. BMI screening can be repeated to see if these changes are working.  How is BMI calculated?  BMI involves measuring your weight in relation to your height. Both height and weight are measured, and the BMI is calculated from those numbers. This can be done either in English ([place] or metric measurements. Note that charts and online BMI calculators are available to help you find your BMI quickly and easily without having to do these calculations yourself.  To calculate your BMI in English ([place] measurements:    1. Measure your weight in pounds (lb).  2. Multiply the number of pounds by 703.  ? For example, for a person who weighs 180 lb, multiply that number by 703, which equals 126,540.  3. Measure your height in inches. Then multiply that number by itself to get a measurement called \"inches squared.\"  ? For example, for a person who is 70 inches tall, the \"inches squared\" measurement is 70 inches x 70 inches, which equals 4,900 inches squared.  4. Divide the total from step 2 (number of lb x 703) by the total from step 3 (inches squared): 126,540 ÷ 4,900 = 25.8. This is " "your BMI.  To calculate your BMI in metric measurements:  1. Measure your weight in kilograms (kg).  2. Measure your height in meters (m). Then multiply that number by itself to get a measurement called \"meters squared.\"  ? For example, for a person who is 1.75 m tall, the \"meters squared\" measurement is 1.75 m x 1.75 m, which is equal to 3.1 meters squared.  3. Divide the number of kilograms (your weight) by the meters squared number. In this example: 70 ÷ 3.1 = 22.6. This is your BMI.  What do the results mean?  BMI charts are used to identify whether you are underweight, normal weight, overweight, or obese. The following guidelines will be used:  · Underweight: BMI less than 18.5.  · Normal weight: BMI between 18.5 and 24.9.  · Overweight: BMI between 25 and 29.9.  · Obese: BMI of 30 or above.  Keep these notes in mind:  · Weight includes both fat and muscle, so someone with a muscular build, such as an athlete, may have a BMI that is higher than 24.9. In cases like these, BMI is not an accurate measure of body fat.  · To determine if excess body fat is the cause of a BMI of 25 or higher, further assessments may need to be done by a health care provider.  · BMI is usually interpreted in the same way for men and women.  Where to find more information  For more information about BMI, including tools to quickly calculate your BMI, go to these websites:  · Centers for Disease Control and Prevention: www.cdc.gov  · American Heart Association: www.heart.org  · National Heart, Lung, and Blood [place]: www.nhlbi.nih.gov  Summary  · Body mass index (BMI) is a number that is calculated from a person's weight and height.  · BMI may help estimate how much of a person's weight is composed of fat. BMI can help identify those who may be at higher risk for certain medical problems.  · BMI can be measured using English measurements or metric measurements.  · BMI charts are used to identify whether you are underweight, normal " weight, overweight, or obese.  This information is not intended to replace advice given to you by your health care provider. Make sure you discuss any questions you have with your health care provider.  Document Revised: 09/09/2020 Document Reviewed: 07/17/2020  Elsevier Patient Education © 2021 [employer]

## 2021-08-27 ENCOUNTER — TELEPHONE (OUTPATIENT)
Dept: INTERNAL MEDICINE | Facility: CLINIC | Age: 61
End: 2021-08-27

## 2021-08-27 LAB
BACTERIA UR CULT: NO GROWTH
BACTERIA UR CULT: NORMAL

## 2021-08-27 NOTE — TELEPHONE ENCOUNTER
----- Message from Sravanthi Oliveros DO sent at 8/27/2021 11:43 AM CDT -----  Urine culture negatiave

## 2021-08-30 ENCOUNTER — TRANSCRIBE ORDERS (OUTPATIENT)
Dept: LAB | Facility: HOSPITAL | Age: 61
End: 2021-08-30

## 2021-08-30 DIAGNOSIS — Z11.59 SCREENING FOR VIRAL DISEASE: Primary | ICD-10-CM

## 2021-09-01 ENCOUNTER — PRE-ADMISSION TESTING (OUTPATIENT)
Dept: PREADMISSION TESTING | Facility: HOSPITAL | Age: 61
End: 2021-09-01

## 2021-09-01 VITALS
HEART RATE: 67 BPM | OXYGEN SATURATION: 97 % | RESPIRATION RATE: 18 BRPM | WEIGHT: 212.08 LBS | DIASTOLIC BLOOD PRESSURE: 76 MMHG | BODY MASS INDEX: 30.36 KG/M2 | SYSTOLIC BLOOD PRESSURE: 136 MMHG | HEIGHT: 70 IN

## 2021-09-01 PROCEDURE — 93010 ELECTROCARDIOGRAM REPORT: CPT | Performed by: INTERNAL MEDICINE

## 2021-09-01 PROCEDURE — 80048 BASIC METABOLIC PNL TOTAL CA: CPT | Performed by: UROLOGY

## 2021-09-01 PROCEDURE — 85027 COMPLETE CBC AUTOMATED: CPT | Performed by: UROLOGY

## 2021-09-01 PROCEDURE — 93005 ELECTROCARDIOGRAM TRACING: CPT | Performed by: UROLOGY

## 2021-09-01 NOTE — DISCHARGE INSTRUCTIONS
DAY OF SURGERY INSTRUCTIONS        YOUR SURGEON: Dr. Alonzo     PROCEDURE: Cystoscopy Transurethral Resection of Prostate     DATE OF SURGERY: September 7, 2021     ARRIVAL TIME: AS DIRECTED BY OFFICE    YOU MAY TAKE THE FOLLOWING MEDICATION(S) THE MORNING OF SURGERY WITH A SIP OF WATER: metoprolol, gabapentin, hydrocodone     ALL OTHER HOME MEDICATIONS CHECK WITH YOUR DOCTOR    DO NOT TAKE ANY ERECTILE DYSFUNCTION MEDICATIONS (EX:  CIALIS, VIAGRA) 24 HOURS PRIOR TO SURGERY              MANAGING PAIN AFTER SURGERY    We know you are probably wondering what your pain will be like after surgery.  Following surgery it is unrealistic to expect you will not have pain.   Pain is how our bodies let us know that something is wrong or cautions us to be careful.  That said, our goal is to make your pain tolerable.    Methods we may use to treat your pain include (oral or IV medications, PCAs, epidurals, nerve blocks, etc.)   While some procedures require IV pain medications for a short time after surgery, transitioning to pain medications by mouth allows for better management of pain.   Your nurse will encourage you to take oral pain medications whenever possible.  IV medications work almost immediately, but only last a short while.  Taking medications by mouth allows for a more constant level of medication in your blood stream for a longer period of time.      Once your pain is out of control it is harder to get back under control.  It is important you are aware when your next dose of pain medication is due.  If you are admitted, your nurse may write the time of your next dose on the white board in your room to help you remember.      We are interested in your pain and encourage you to inform us about aggravating factors during your visit.   Many times a simple repositioning every few hours can make a big difference.    If your physician says it is okay, do not let your pain prevent you from getting out of bed. Be sure to  call your nurse for assistance prior to getting up so you do not fall.      Before surgery, please decide your tolerable pain goal.  These faces help describe the pain ratings we use on a 0-10 scale.   Be prepared to tell us your goal and whether or not you take pain or anxiety medications at home.      BEFORE YOU COME TO THE HOSPITAL  (Pre-op instructions)  • Do not eat, drink, smoke or chew gum after midnight the night before surgery.  This also includes no mints.  • Morning of surgery take only the medicines you have been instructed with a sip of water unless otherwise instructed  by your physician.  • Do not shave, wear makeup or dark nail polish.  • Remove all jewelry including rings.  • Leave anything you consider valuable at home.  • Leave your suitcase in the car until after your surgery.  • Bring the following with you if applicable:  o Picture ID and insurance, Medicare or Medicaid cards  o Co-pay/deductible required by insurance (cash, check, credit card)  o Copy of advance directive, living will or power-of- documents if not brought to PAT  o CPAP or BIPAP mask and tubing  o Relaxation aids ( book, magazine), etc.  o Hearing aids                                 ON THE DAY OF SURGERY  · On the day of surgery check in at registration located at the main entrance of the hospital.   ? You will be registered and given a beeper with instructions where to wait in the main lobby.  ? When your beeper lights up and vibrates a member of the Outpatient Surgery staff will meet you at the double doors under the stair steps and escort you to your preoperative room.   · You may have cloth compression devices placed on your legs. These help to prevent blood clots and reduce swelling in your legs.  · An IV may be inserted into one of your veins.  · In the operating room, you may be given one or more of the following:  ? A medicine to help you relax (sedative).  ? A medicine to numb the area (local  "anesthetic).  ? A medicine to make you fall asleep (general anesthetic).  ? A medicine that is injected into an area of your body to numb everything below the injection site (regional anesthetic).  · Your surgical site will be marked or identified.  · You may be given an antibiotic through your IV to help prevent infection.  Contact a health care provider if you:  · Develop a fever of more than 100.4°F (38°C) or other feelings of illness during the 48 hours before your surgery.  · Have symptoms that get worse.  Have questions or concerns about your surgery    General Anesthesia/Surgery, Adult  General anesthesia is the use of medicines to make a person \"go to sleep\" (unconscious) for a medical procedure. General anesthesia must be used for certain procedures, and is often recommended for procedures that:  · Last a long time.  · Require you to be still or in an unusual position.  · Are major and can cause blood loss.  The medicines used for general anesthesia are called general anesthetics. As well as making you unconscious for a certain amount of time, these medicines:  · Prevent pain.  · Control your blood pressure.  · Relax your muscles.  Tell a health care provider about:  · Any allergies you have.  · All medicines you are taking, including vitamins, herbs, eye drops, creams, and over-the-counter medicines.  · Any problems you or family members have had with anesthetic medicines.  · Types of anesthetics you have had in the past.  · Any blood disorders you have.  · Any surgeries you have had.  · Any medical conditions you have.  · Any recent upper respiratory, chest, or ear infections.  · Any history of:  ? Heart or lung conditions, such as heart failure, sleep apnea, asthma, or chronic obstructive pulmonary disease (COPD).  ?  service.  ? Depression or anxiety.  · Any tobacco or drug use, including marijuana or alcohol use.  · Whether you are pregnant or may be pregnant.  What are the risks?  Generally, " this is a safe procedure. However, problems may occur, including:  · Allergic reaction.  · Lung and heart problems.  · Inhaling food or liquid from the stomach into the lungs (aspiration).  · Nerve injury.  · Air in the bloodstream, which can lead to stroke.  · Extreme agitation or confusion (delirium) when you wake up from the anesthetic.  · Waking up during your procedure and being unable to move. This is rare.  These problems are more likely to develop if you are having a major surgery or if you have an advanced or serious medical condition. You can prevent some of these complications by answering all of your health care provider's questions thoroughly and by following all instructions before your procedure.  General anesthesia can cause side effects, including:  · Nausea or vomiting.  · A sore throat from the breathing tube.  · Hoarseness.  · Wheezing or coughing.  · Shaking chills.  · Tiredness.  · Body aches.  · Anxiety.  · Sleepiness or drowsiness.  · Confusion or agitation.  RISKS AND COMPLICATIONS OF SURGERY  Your health care provider will discuss possible risks and complications with you before surgery. Common risks and complications include:    · Problems due to the use of anesthetics.  · Blood loss and replacement (does not apply to minor surgical procedures).  · Temporary increase in pain due to surgery.  · Uncorrected pain or problems that the surgery was meant to correct.  · Infection.  · New damage.    What happens before the procedure?    Medicines  Ask your health care provider about:  · Changing or stopping your regular medicines. This is especially important if you are taking diabetes medicines or blood thinners.  · Taking medicines such as aspirin and ibuprofen. These medicines can thin your blood. Do not take these medicines unless your health care provider tells you to take them.  · Taking over-the-counter medicines, vitamins, herbs, and supplements. Do not take these during the week before  your procedure unless your health care provider approves them.  General instructions  · Starting 3-6 weeks before the procedure, do not use any products that contain nicotine or tobacco, such as cigarettes and e-cigarettes. If you need help quitting, ask your health care provider.  · If you brush your teeth on the morning of the procedure, make sure to spit out all of the toothpaste.  · Tell your health care provider if you become ill or develop a cold, cough, or fever.  · If instructed by your health care provider, bring your sleep apnea device with you on the day of your surgery (if applicable).  · Ask your health care provider if you will be going home the same day, the following day, or after a longer hospital stay.  ? Plan to have someone take you home from the hospital or clinic.  ? Plan to have a responsible adult care for you for at least 24 hours after you leave the hospital or clinic. This is important.  What happens during the procedure?  · You will be given anesthetics through both of the following:  ? A mask placed over your nose and mouth.  ? An IV in one of your veins.  · You may receive a medicine to help you relax (sedative).  · After you are unconscious, a breathing tube may be inserted down your throat to help you breathe. This will be removed before you wake up.  · An anesthesia specialist will stay with you throughout your procedure. He or she will:  ? Keep you comfortable and safe by continuing to give you medicines and adjusting the amount of medicine that you get.  ? Monitor your blood pressure, pulse, and oxygen levels to make sure that the anesthetics do not cause any problems.  The procedure may vary among health care providers and hospitals.  What happens after the procedure?  · Your blood pressure, temperature, heart rate, breathing rate, and blood oxygen level will be monitored until the medicines you were given have worn off.  · You will wake up in a recovery area. You may wake up  slowly.  · If you feel anxious or agitated, you may be given medicine to help you calm down.  · If you will be going home the same day, your health care provider may check to make sure you can walk, drink, and urinate.  · Your health care provider will treat any pain or side effects you have before you go home.  · Do not drive for 24 hours if you were given a sedative.  Summary  · General anesthesia is used to keep you still and prevent pain during a procedure.  · It is important to tell your healthcare provider about your medical history and any surgeries you have had, and previous experience with anesthesia.  · Follow your healthcare provider’s instructions about when to stop eating, drinking, or taking certain medicines before your procedure.  · Plan to have someone take you home from the hospital or clinic.  This information is not intended to replace advice given to you by your health care provider. Make sure you discuss any questions you have with your health care provider.  Document Released: 03/26/2009 Document Revised: 08/03/2018 Document Reviewed: 08/03/2018  [employer] Interactive Patient Education © 2019 [employer]      Fall Prevention in Hospitals, Adult  As a hospital patient, your condition and the treatments you receive can increase your risk for falls. Some additional risk factors for falls in a hospital include:  · Being in an unfamiliar environment.  · Being on bed rest.  · Your surgery.  · Taking certain medicines.  · Your tubing requirements, such as intravenous (IV) therapy or catheters.  It is important that you learn how to decrease fall risks while at the hospital. Below are important tips that can help prevent falls.  SAFETY TIPS FOR PREVENTING FALLS  Talk about your risk of falling.  · Ask your health care provider why you are at risk for falling. Is it your medicine, illness, tubing placement, or something else?  · Make a plan with your health care provider to keep you safe from  falls.  · Ask your health care provider or pharmacist about side effects of your medicines. Some medicines can make you dizzy or affect your coordination.  Ask for help.  · Ask for help before getting out of bed. You may need to press your call button.  · Ask for assistance in getting safely to the toilet.  · Ask for a walker or cane to be put at your bedside. Ask that most of the side rails on your bed be placed up before your health care provider leaves the room.  · Ask family or friends to sit with you.  · Ask for things that are out of your reach, such as your glasses, hearing aids, telephone, bedside table, or call button.  Follow these tips to avoid falling:  · Stay lying or seated, rather than standing, while waiting for help.  · Wear rubber-soled slippers or shoes whenever you walk in the hospital.  · Avoid quick, sudden movements.  ¨ Change positions slowly.  ¨ Sit on the side of your bed before standing.  ¨ Stand up slowly and wait before you start to walk.  · Let your health care provider know if there is a spill on the floor.  · Pay careful attention to the medical equipment, electrical cords, and tubes around you.  · When you need help, use your call button by your bed or in the bathroom. Wait for one of your health care providers to help you.  · If you feel dizzy or unsure of your footing, return to bed and wait for assistance.  · Avoid being distracted by the TV, telephone, or another person in your room.  · Do not lean or support yourself on rolling objects, such as IV poles or bedside tables.     This information is not intended to replace advice given to you by your health care provider. Make sure you discuss any questions you have with your health care provider.     Document Released: 12/15/2001 Document Revised: 01/08/2016 Document Reviewed: 08/25/2013  [employer] Interactive Patient Education ©2016 [employer]            PATIENT/FAMILY/RESPONSIBLE PARTY VERBALIZES UNDERSTANDING OF ABOVE  EDUCATION.  COPY OF PAIN SCALE GIVEN AND REVIEWED WITH VERBALIZED UNDERSTANDING.

## 2021-09-02 ENCOUNTER — OFFICE VISIT (OUTPATIENT)
Dept: INTERNAL MEDICINE | Facility: CLINIC | Age: 61
End: 2021-09-02

## 2021-09-02 VITALS
OXYGEN SATURATION: 95 % | HEIGHT: 70 IN | TEMPERATURE: 97.5 F | HEART RATE: 74 BPM | BODY MASS INDEX: 30.55 KG/M2 | RESPIRATION RATE: 19 BRPM | SYSTOLIC BLOOD PRESSURE: 140 MMHG | DIASTOLIC BLOOD PRESSURE: 80 MMHG | WEIGHT: 213.4 LBS

## 2021-09-02 DIAGNOSIS — R39.16 BENIGN PROSTATIC HYPERPLASIA (BPH) WITH STRAINING ON URINATION: Primary | ICD-10-CM

## 2021-09-02 DIAGNOSIS — N32.89 BLADDER SPASM: ICD-10-CM

## 2021-09-02 DIAGNOSIS — N40.1 BENIGN PROSTATIC HYPERPLASIA (BPH) WITH STRAINING ON URINATION: Primary | ICD-10-CM

## 2021-09-02 DIAGNOSIS — R60.9 EDEMA, UNSPECIFIED TYPE: ICD-10-CM

## 2021-09-02 LAB
QT INTERVAL: 434 MS
QTC INTERVAL: 434 MS

## 2021-09-02 PROCEDURE — 99213 OFFICE O/P EST LOW 20 MIN: CPT | Performed by: FAMILY MEDICINE

## 2021-09-02 RX ORDER — OXYBUTYNIN CHLORIDE 5 MG/1
5 TABLET ORAL 3 TIMES DAILY
Qty: 30 TABLET | Refills: 0 | Status: SHIPPED | OUTPATIENT
Start: 2021-09-02 | End: 2022-02-16 | Stop reason: SDUPTHER

## 2021-09-02 NOTE — PROGRESS NOTES
Subjective     Chief Complaint   Patient presents with   • Flank Pain     Follow up.    • Back Pain   • Foot Swelling       History of Present Illness  Patient presents for follow up on flank pain.  He is doing better.  Has been to see Dr Alonzo and is to have surgery on prostate.  His back still hurts some.  Likely related to issues with urine.  He has also been having problems with foot swelling.   No real pain.    Patient's PMR from outside medical facility reviewed and noted.    Review of Systems     Otherwise complete ROS reviewed and negative except as mentioned in the HPI.    Past Medical History:   Past Medical History:   Diagnosis Date   • Coronary artery disease    • Diverticulosis    • Hx of heart artery stent     15 years ago x2 stents    • Hypertension    • Kidney stones      Past Surgical History:  Past Surgical History:   Procedure Laterality Date   • APPENDECTOMY     • CARDIAC CATHETERIZATION      15 years ago 2 stents per Dr. Humphrey    • CYSTOSCOPY TRANSURETHRAL RESECTION OF PROSTATE N/A 9/7/2021    Procedure: CYSTOSCOPY TRANSURETHRAL RESECTION OF PROSTATE;  Surgeon: Ervin Alonzo MD;  Location: [facility];  Service: Urology;  Laterality: N/A;   • KNEE ARTHROPLASTY Right    • PERIPHERAL ARTERIAL STENT GRAFT     • TOTAL HIP ARTHROPLASTY Right      Social History:  reports that he has been smoking cigarettes. He has a 10.00 pack-year smoking history. He has never used smokeless tobacco. He reports that he does not drink alcohol and does not use drugs.    Family History: family history includes Diabetes in his mother; Heart disease in his father and mother.       Allergies:  Allergies   Allergen Reactions   • Contrast Dye Hives   • Levaquin [Levofloxacin] Nausea Only, Swelling, Dizziness and Angioedema     Medications:  Prior to Admission medications    Medication Sig Start Date End Date Taking? Authorizing Provider   albuterol sulfate  (90 Base) MCG/ACT inhaler Inhale 2 puffs Every 4  "(Four) Hours As Needed for Wheezing. 8/12/21  Yes Sravanthi Oliveros DO   aspirin 81 MG chewable tablet Chew 81 mg Daily.   Yes Jaguar Florez MD   cefdinir (OMNICEF) 300 MG capsule Take 1 capsule by mouth 2 (Two) Times a Day. 8/25/21  Yes Sravanthi Oliveros DO   finasteride (PROSCAR) 5 MG tablet Take 1 tablet by mouth Daily. 8/26/21  Yes Ervin Alonzo MD   fluconazole (Diflucan) 200 MG tablet Take 1 tablet by mouth Daily. 6/16/21  Yes Sravanthi Oliveros DO   gabapentin (NEURONTIN) 300 MG capsule Take 2 capsules by mouth 4 (Four) Times a Day for 240 days. 4/28/21 12/24/21 Yes Sravanthi Oliveros DO   glucose blood test strip Use as instructed 8/25/21  Yes Sravanthi Oliveros DO   glucose monitor monitoring kit 1 each 4 (Four) Times a Day Before Meals & at Bedtime. 8/25/21  Yes Sravanthi Oliveros DO   HYDROcodone-acetaminophen (Norco) 5-325 MG per tablet Take 1 tablet by mouth Every 6 (Six) Hours As Needed for Moderate Pain . 8/25/21  Yes Sravanthi Oliveros DO   Lancets 30G misc 1 each 4 (Four) Times a Day Before Meals & at Bedtime. 8/25/21  Yes Sravanthi Oliveros DO   metoprolol tartrate (LOPRESSOR) 25 MG tablet TAKE 1 TABLET BY MOUTH TWICE A DAY  Patient taking differently: Take 25 mg by mouth 2 (Two) Times a Day. 5/11/21  Yes Lisy Melendrez APRN   nystatin (MYCOSTATIN) 369902 UNIT/ML suspension Swish and swallow 5 mL 4 (Four) Times a Day. 6/16/21  Yes Sravanthi Oliveros DO   ondansetron ODT (Zofran ODT) 4 MG disintegrating tablet Place 1 tablet on the tongue Every 8 (Eight) Hours As Needed for Nausea or Vomiting. 8/12/21  Yes Sravanthi Oliveros DO   oxybutynin (DITROPAN) 5 MG tablet Take 1 tablet by mouth 3 (Three) Times a Day. 9/2/21   Horn, Sravanthi Clara, DO       Objective     Vital Signs: /80 (BP Location: Right arm, Patient Position: Sitting, Cuff Size: Adult)   Pulse 74   Temp 97.5 °F (36.4 °C) (Skin)   Resp 19   Ht 177.8 cm (70\")   Wt 96.8 kg (213 lb 6.4 " oz)   SpO2 95%   BMI 30.62 kg/m²   Physical Exam  Vitals and nursing note reviewed.   Constitutional:       Appearance: Normal appearance.   HENT:      Head: Normocephalic and atraumatic.      Right Ear: External ear normal.      Left Ear: External ear normal.      Nose: Nose normal.      Mouth/Throat:      Mouth: Mucous membranes are moist.   Eyes:      Extraocular Movements: Extraocular movements intact.      Conjunctiva/sclera: Conjunctivae normal.      Pupils: Pupils are equal, round, and reactive to light.   Cardiovascular:      Rate and Rhythm: Normal rate and regular rhythm.      Pulses: Normal pulses.      Heart sounds: Normal heart sounds.   Pulmonary:      Effort: Pulmonary effort is normal.      Breath sounds: Normal breath sounds.   Abdominal:      General: Bowel sounds are normal.      Palpations: Abdomen is soft.   Genitourinary:     Comments: Sellers catheter in place to dependent drainage.   Musculoskeletal:         General: Normal range of motion.      Cervical back: Normal range of motion and neck supple.      Comments: Trace edema bilateral lower legs/feet.    Skin:     General: Skin is warm and dry.      Capillary Refill: Capillary refill takes less than 2 seconds.   Neurological:      General: No focal deficit present.      Mental Status: He is alert and oriented to person, place, and time.      Cranial Nerves: No cranial nerve deficit.   Psychiatric:         Mood and Affect: Mood normal.         Behavior: Behavior normal.         Patient's Body mass index is 30.62 kg/m².       Results Reviewed:  Glucose   Date Value Ref Range Status   09/01/2021 120 (H) 65 - 99 mg/dL Final     BUN   Date Value Ref Range Status   09/01/2021 14 8 - 23 mg/dL Final   06/24/2019 11 7 - 18 mg/dL Final     Creatinine   Date Value Ref Range Status   09/01/2021 1.05 0.76 - 1.27 mg/dL Final   06/24/2019 1.10 0.60 - 1.30 mg/dL Final     Sodium   Date Value Ref Range Status   09/01/2021 138 136 - 145 mmol/L Final    06/24/2019 138 135 - 145 mmol/L Final     Potassium   Date Value Ref Range Status   09/01/2021 4.2 3.5 - 5.2 mmol/L Final   06/24/2019 4.6 3.5 - 5.0 mmol/L Final     Chloride   Date Value Ref Range Status   09/01/2021 102 98 - 107 mmol/L Final   06/24/2019 101 98 - 107 mmol/L Final     CO2   Date Value Ref Range Status   09/01/2021 26.0 22.0 - 29.0 mmol/L Final     Total CO2   Date Value Ref Range Status   06/24/2019 26 21 - 32 mmol/L Final     Calcium   Date Value Ref Range Status   09/01/2021 8.6 8.6 - 10.5 mg/dL Final   06/24/2019 8.9 8.5 - 10.1 mg/dL Final     Comment:       Calcium measurements are adversely affected by the use of Omniscan during MRI. Analysis of calcium is not recommended for 12 to 24 hours after the use of the contrast agent.      ALT (SGPT)   Date Value Ref Range Status   06/02/2021 17 0 - 44 IU/L Final   06/07/2020 20 1 - 41 U/L Final   06/24/2019 33 16 - 62 U/L Final     AST (SGOT)   Date Value Ref Range Status   06/02/2021 17 0 - 40 IU/L Final   06/07/2020 18 1 - 40 U/L Final     Comment:     Specimen hemolyzed.  Results may be affected.   06/24/2019 18 7 - 34 U/L Final     WBC   Date Value Ref Range Status   09/01/2021 9.26 3.40 - 10.80 10*3/mm3 Final   06/02/2021 11.3 (H) 3.4 - 10.8 x10E3/uL Final   06/24/2019 8.7 5.0 - 10.0 K/uL Final     Hematocrit   Date Value Ref Range Status   09/01/2021 40.0 37.5 - 51.0 % Final   06/24/2019 47.6 40.0 - 54.0 % Final     Platelets   Date Value Ref Range Status   09/01/2021 338 140 - 450 10*3/mm3 Final   06/24/2019 314 150 - 500 K/uL Final     Total Cholesterol   Date Value Ref Range Status   09/30/2018 190 130 - 200 mg/dL Final     Triglycerides   Date Value Ref Range Status   09/30/2018 244 (H) 0 - 149 mg/dL Final     HDL Cholesterol   Date Value Ref Range Status   09/30/2018 33 (L) >=40 mg/dL Final     LDL Cholesterol    Date Value Ref Range Status   09/30/2018 130 (H) 0 - 99 mg/dL Final     LDL/HDL Ratio   Date Value Ref Range Status    09/30/2018 3.28  Final     Hemoglobin A1C   Date Value Ref Range Status   08/25/2021 6.4 % Final         Assessment / Plan     Assessment/Plan:  1. Benign prostatic hyperplasia (BPH) with straining on urination      2. Bladder spasm      3. Edema, unspecified type    Trial of oxybutinin 5 mg daily          Return in about 3 months (around 12/2/2021). unless patient needs to be seen sooner or acute issues arise.        I have discussed the patient results/orders and and plan/recommendation with them at today's visit.      Sravanthi Oliveros,    09/02/2021

## 2021-09-03 RX ORDER — FUROSEMIDE 20 MG/1
TABLET ORAL
Qty: 30 TABLET | Refills: 0 | Status: SHIPPED | OUTPATIENT
Start: 2021-09-03 | End: 2021-09-28

## 2021-09-03 NOTE — TELEPHONE ENCOUNTER
Rx Refill Note  Requested Prescriptions     Pending Prescriptions Disp Refills   • furosemide (LASIX) 20 MG tablet [Pharmacy Med Name: FUROSEMIDE 20 MG TABLET] 30 tablet 0     Sig: TAKE 1 TABLET BY MOUTH EVERY DAY         Last office visit with prescribing clinician: 9/2/2021      Next office visit with prescribing clinician: Visit date not found     Don't see this on med list. Says it was discontinued.            Johana Campbell RN  09/03/21, 08:38 CDT

## 2021-09-04 ENCOUNTER — LAB (OUTPATIENT)
Dept: LAB | Facility: HOSPITAL | Age: 61
End: 2021-09-04

## 2021-09-04 LAB — SARS-COV-2 ORF1AB RESP QL NAA+PROBE: NOT DETECTED

## 2021-09-04 PROCEDURE — C9803 HOPD COVID-19 SPEC COLLECT: HCPCS | Performed by: UROLOGY

## 2021-09-04 PROCEDURE — U0004 COV-19 TEST NON-CDC HGH THRU: HCPCS | Performed by: UROLOGY

## 2021-09-07 ENCOUNTER — ANESTHESIA (OUTPATIENT)
Dept: PERIOP | Facility: HOSPITAL | Age: 61
End: 2021-09-07

## 2021-09-07 ENCOUNTER — ANESTHESIA EVENT (OUTPATIENT)
Dept: PERIOP | Facility: HOSPITAL | Age: 61
End: 2021-09-07

## 2021-09-07 ENCOUNTER — HOSPITAL ENCOUNTER (OUTPATIENT)
Facility: HOSPITAL | Age: 61
Discharge: HOME | End: 2021-09-08
Attending: UROLOGY | Admitting: UROLOGY

## 2021-09-07 DIAGNOSIS — R39.16 BENIGN PROSTATIC HYPERPLASIA (BPH) WITH STRAINING ON URINATION: ICD-10-CM

## 2021-09-07 DIAGNOSIS — N40.1 BENIGN PROSTATIC HYPERPLASIA (BPH) WITH STRAINING ON URINATION: ICD-10-CM

## 2021-09-07 PROCEDURE — A9270 NON-COVERED ITEM OR SERVICE: HCPCS | Performed by: UROLOGY

## 2021-09-07 PROCEDURE — 63710000001 HYDROCODONE-ACETAMINOPHEN 10-325 MG TABLET: Performed by: UROLOGY

## 2021-09-07 PROCEDURE — 63710000001 FUROSEMIDE 20 MG TABLET: Performed by: UROLOGY

## 2021-09-07 PROCEDURE — 52601 PROSTATECTOMY (TURP): CPT | Performed by: UROLOGY

## 2021-09-07 PROCEDURE — G0378 HOSPITAL OBSERVATION PER HR: HCPCS

## 2021-09-07 PROCEDURE — 63710000001 OXYCODONE-ACETAMINOPHEN 10-325 MG TABLET: Performed by: ANESTHESIOLOGY

## 2021-09-07 PROCEDURE — 25010000002 ONDANSETRON PER 1 MG: Performed by: NURSE ANESTHETIST, CERTIFIED REGISTERED

## 2021-09-07 PROCEDURE — A9270 NON-COVERED ITEM OR SERVICE: HCPCS | Performed by: ANESTHESIOLOGY

## 2021-09-07 PROCEDURE — 25010000002 ONDANSETRON PER 1 MG: Performed by: ANESTHESIOLOGY

## 2021-09-07 PROCEDURE — 25010000002 HYDROMORPHONE PER 4 MG: Performed by: ANESTHESIOLOGY

## 2021-09-07 PROCEDURE — 63710000001 CEFDINIR 300 MG CAPSULE: Performed by: UROLOGY

## 2021-09-07 PROCEDURE — 63710000001 FINASTERIDE 5 MG TABLET: Performed by: UROLOGY

## 2021-09-07 PROCEDURE — 63710000001 METOPROLOL TARTRATE 25 MG TABLET: Performed by: UROLOGY

## 2021-09-07 PROCEDURE — 25010000002 GENTAMICIN PER 80 MG: Performed by: UROLOGY

## 2021-09-07 PROCEDURE — 25010000002 FENTANYL CITRATE (PF) 50 MCG/ML SOLUTION: Performed by: ANESTHESIOLOGY

## 2021-09-07 PROCEDURE — 25010000002 MIDAZOLAM PER 1 MG: Performed by: ANESTHESIOLOGY

## 2021-09-07 PROCEDURE — 25010000002 PROPOFOL 10 MG/ML EMULSION: Performed by: NURSE ANESTHETIST, CERTIFIED REGISTERED

## 2021-09-07 PROCEDURE — 63710000001 GABAPENTIN 300 MG CAPSULE: Performed by: UROLOGY

## 2021-09-07 PROCEDURE — 25010000002 FENTANYL CITRATE (PF) 250 MCG/5ML SOLUTION: Performed by: NURSE ANESTHETIST, CERTIFIED REGISTERED

## 2021-09-07 PROCEDURE — 25010000003 HYDROMORPHONE 1 MG/ML SOLUTION: Performed by: UROLOGY

## 2021-09-07 PROCEDURE — 88305 TISSUE EXAM BY PATHOLOGIST: CPT | Performed by: UROLOGY

## 2021-09-07 PROCEDURE — 25010000002 KETOROLAC TROMETHAMINE PER 15 MG: Performed by: NURSE ANESTHETIST, CERTIFIED REGISTERED

## 2021-09-07 RX ORDER — LIDOCAINE HYDROCHLORIDE 20 MG/ML
INJECTION, SOLUTION EPIDURAL; INFILTRATION; INTRACAUDAL; PERINEURAL AS NEEDED
Status: DISCONTINUED | OUTPATIENT
Start: 2021-09-07 | End: 2021-09-07 | Stop reason: SURG

## 2021-09-07 RX ORDER — ONDANSETRON 2 MG/ML
INJECTION INTRAMUSCULAR; INTRAVENOUS AS NEEDED
Status: DISCONTINUED | OUTPATIENT
Start: 2021-09-07 | End: 2021-09-07 | Stop reason: SURG

## 2021-09-07 RX ORDER — OXYCODONE AND ACETAMINOPHEN 10; 325 MG/1; MG/1
1 TABLET ORAL ONCE AS NEEDED
Status: COMPLETED | OUTPATIENT
Start: 2021-09-07 | End: 2021-09-07

## 2021-09-07 RX ORDER — LIDOCAINE HYDROCHLORIDE 10 MG/ML
0.5 INJECTION, SOLUTION EPIDURAL; INFILTRATION; INTRACAUDAL; PERINEURAL ONCE AS NEEDED
Status: DISCONTINUED | OUTPATIENT
Start: 2021-09-07 | End: 2021-09-07

## 2021-09-07 RX ORDER — ATROPA BELLADONNA AND OPIUM 16.2; 6 MG/1; MG/1
30 SUPPOSITORY RECTAL DAILY PRN
Status: DISCONTINUED | OUTPATIENT
Start: 2021-09-07 | End: 2021-09-08 | Stop reason: HOSPADM

## 2021-09-07 RX ORDER — ONDANSETRON 4 MG/1
4 TABLET, FILM COATED ORAL EVERY 6 HOURS PRN
Status: DISCONTINUED | OUTPATIENT
Start: 2021-09-07 | End: 2021-09-08 | Stop reason: HOSPADM

## 2021-09-07 RX ORDER — SODIUM CHLORIDE, SODIUM LACTATE, POTASSIUM CHLORIDE, CALCIUM CHLORIDE 600; 310; 30; 20 MG/100ML; MG/100ML; MG/100ML; MG/100ML
1000 INJECTION, SOLUTION INTRAVENOUS CONTINUOUS
Status: DISCONTINUED | OUTPATIENT
Start: 2021-09-07 | End: 2021-09-07

## 2021-09-07 RX ORDER — METOPROLOL TARTRATE 5 MG/5ML
2 INJECTION INTRAVENOUS ONCE AS NEEDED
Status: COMPLETED | OUTPATIENT
Start: 2021-09-07 | End: 2021-09-07

## 2021-09-07 RX ORDER — LIDOCAINE HYDROCHLORIDE 10 MG/ML
0.5 INJECTION, SOLUTION EPIDURAL; INFILTRATION; INTRACAUDAL; PERINEURAL ONCE AS NEEDED
Status: DISCONTINUED | OUTPATIENT
Start: 2021-09-07 | End: 2021-09-07 | Stop reason: HOSPADM

## 2021-09-07 RX ORDER — FENTANYL CITRATE 50 UG/ML
25 INJECTION, SOLUTION INTRAMUSCULAR; INTRAVENOUS
Status: DISCONTINUED | OUTPATIENT
Start: 2021-09-07 | End: 2021-09-07 | Stop reason: HOSPADM

## 2021-09-07 RX ORDER — SODIUM CHLORIDE 0.9 % (FLUSH) 0.9 %
3 SYRINGE (ML) INJECTION AS NEEDED
Status: DISCONTINUED | OUTPATIENT
Start: 2021-09-07 | End: 2021-09-07 | Stop reason: HOSPADM

## 2021-09-07 RX ORDER — ONDANSETRON 2 MG/ML
4 INJECTION INTRAMUSCULAR; INTRAVENOUS ONCE
Status: COMPLETED | OUTPATIENT
Start: 2021-09-07 | End: 2021-09-07

## 2021-09-07 RX ORDER — LABETALOL HYDROCHLORIDE 5 MG/ML
5 INJECTION, SOLUTION INTRAVENOUS
Status: DISCONTINUED | OUTPATIENT
Start: 2021-09-07 | End: 2021-09-07 | Stop reason: HOSPADM

## 2021-09-07 RX ORDER — GABAPENTIN 300 MG/1
600 CAPSULE ORAL 4 TIMES DAILY
Status: DISCONTINUED | OUTPATIENT
Start: 2021-09-07 | End: 2021-09-08 | Stop reason: HOSPADM

## 2021-09-07 RX ORDER — FENTANYL CITRATE 50 UG/ML
INJECTION, SOLUTION INTRAMUSCULAR; INTRAVENOUS AS NEEDED
Status: DISCONTINUED | OUTPATIENT
Start: 2021-09-07 | End: 2021-09-07 | Stop reason: SURG

## 2021-09-07 RX ORDER — KETOROLAC TROMETHAMINE 30 MG/ML
30 INJECTION, SOLUTION INTRAMUSCULAR; INTRAVENOUS ONCE
Status: COMPLETED | OUTPATIENT
Start: 2021-09-07 | End: 2021-09-07

## 2021-09-07 RX ORDER — IBUPROFEN 600 MG/1
600 TABLET ORAL ONCE AS NEEDED
Status: DISCONTINUED | OUTPATIENT
Start: 2021-09-07 | End: 2021-09-07 | Stop reason: HOSPADM

## 2021-09-07 RX ORDER — FINASTERIDE 5 MG/1
5 TABLET, FILM COATED ORAL DAILY
Status: DISCONTINUED | OUTPATIENT
Start: 2021-09-07 | End: 2021-09-08 | Stop reason: HOSPADM

## 2021-09-07 RX ORDER — MIDAZOLAM HYDROCHLORIDE 1 MG/ML
1 INJECTION INTRAMUSCULAR; INTRAVENOUS
Status: DISCONTINUED | OUTPATIENT
Start: 2021-09-07 | End: 2021-09-07 | Stop reason: HOSPADM

## 2021-09-07 RX ORDER — NALOXONE HCL 0.4 MG/ML
0.1 VIAL (ML) INJECTION
Status: DISCONTINUED | OUTPATIENT
Start: 2021-09-07 | End: 2021-09-08 | Stop reason: HOSPADM

## 2021-09-07 RX ORDER — NALOXONE HCL 0.4 MG/ML
0.04 VIAL (ML) INJECTION AS NEEDED
Status: DISCONTINUED | OUTPATIENT
Start: 2021-09-07 | End: 2021-09-07 | Stop reason: HOSPADM

## 2021-09-07 RX ORDER — FUROSEMIDE 20 MG/1
20 TABLET ORAL DAILY
Status: DISCONTINUED | OUTPATIENT
Start: 2021-09-07 | End: 2021-09-08 | Stop reason: HOSPADM

## 2021-09-07 RX ORDER — MAGNESIUM HYDROXIDE 1200 MG/15ML
LIQUID ORAL AS NEEDED
Status: DISCONTINUED | OUTPATIENT
Start: 2021-09-07 | End: 2021-09-07 | Stop reason: HOSPADM

## 2021-09-07 RX ORDER — FLUMAZENIL 0.1 MG/ML
0.2 INJECTION INTRAVENOUS AS NEEDED
Status: DISCONTINUED | OUTPATIENT
Start: 2021-09-07 | End: 2021-09-07 | Stop reason: HOSPADM

## 2021-09-07 RX ORDER — SODIUM CHLORIDE 0.9 % (FLUSH) 0.9 %
3-10 SYRINGE (ML) INJECTION AS NEEDED
Status: DISCONTINUED | OUTPATIENT
Start: 2021-09-07 | End: 2021-09-07 | Stop reason: HOSPADM

## 2021-09-07 RX ORDER — HYDROCODONE BITARTRATE AND ACETAMINOPHEN 5; 325 MG/1; MG/1
1 TABLET ORAL EVERY 4 HOURS PRN
Status: DISCONTINUED | OUTPATIENT
Start: 2021-09-07 | End: 2021-09-08 | Stop reason: HOSPADM

## 2021-09-07 RX ORDER — SODIUM CHLORIDE 0.9 % (FLUSH) 0.9 %
3 SYRINGE (ML) INJECTION EVERY 12 HOURS SCHEDULED
Status: DISCONTINUED | OUTPATIENT
Start: 2021-09-07 | End: 2021-09-07 | Stop reason: HOSPADM

## 2021-09-07 RX ORDER — ALBUTEROL SULFATE 2.5 MG/3ML
2.5 SOLUTION RESPIRATORY (INHALATION) EVERY 4 HOURS PRN
Status: DISCONTINUED | OUTPATIENT
Start: 2021-09-07 | End: 2021-09-08 | Stop reason: HOSPADM

## 2021-09-07 RX ORDER — ATROPA BELLADONNA AND OPIUM 16.2; 6 MG/1; MG/1
SUPPOSITORY RECTAL
Status: DISPENSED
Start: 2021-09-07 | End: 2021-09-08

## 2021-09-07 RX ORDER — HYDROMORPHONE HYDROCHLORIDE 1 MG/ML
0.5 INJECTION, SOLUTION INTRAMUSCULAR; INTRAVENOUS; SUBCUTANEOUS
Status: DISCONTINUED | OUTPATIENT
Start: 2021-09-07 | End: 2021-09-07 | Stop reason: HOSPADM

## 2021-09-07 RX ORDER — ONDANSETRON 2 MG/ML
4 INJECTION INTRAMUSCULAR; INTRAVENOUS EVERY 6 HOURS PRN
Status: DISCONTINUED | OUTPATIENT
Start: 2021-09-07 | End: 2021-09-08 | Stop reason: HOSPADM

## 2021-09-07 RX ORDER — HYDROCODONE BITARTRATE AND ACETAMINOPHEN 5; 325 MG/1; MG/1
1 TABLET ORAL EVERY 6 HOURS PRN
Status: DISCONTINUED | OUTPATIENT
Start: 2021-09-07 | End: 2021-09-07 | Stop reason: SDUPTHER

## 2021-09-07 RX ORDER — ONDANSETRON 2 MG/ML
4 INJECTION INTRAMUSCULAR; INTRAVENOUS AS NEEDED
Status: DISCONTINUED | OUTPATIENT
Start: 2021-09-07 | End: 2021-09-07 | Stop reason: HOSPADM

## 2021-09-07 RX ORDER — HYDROCODONE BITARTRATE AND ACETAMINOPHEN 10; 325 MG/1; MG/1
1 TABLET ORAL EVERY 4 HOURS PRN
Status: DISCONTINUED | OUTPATIENT
Start: 2021-09-07 | End: 2021-09-08 | Stop reason: HOSPADM

## 2021-09-07 RX ORDER — ATROPA BELLADONNA AND OPIUM 16.2; 6 MG/1; MG/1
SUPPOSITORY RECTAL AS NEEDED
Status: DISCONTINUED | OUTPATIENT
Start: 2021-09-07 | End: 2021-09-07 | Stop reason: HOSPADM

## 2021-09-07 RX ORDER — ACETAMINOPHEN 500 MG
1000 TABLET ORAL ONCE
Status: COMPLETED | OUTPATIENT
Start: 2021-09-07 | End: 2021-09-07

## 2021-09-07 RX ORDER — CEFDINIR 300 MG/1
300 CAPSULE ORAL 2 TIMES DAILY
Status: DISCONTINUED | OUTPATIENT
Start: 2021-09-07 | End: 2021-09-08 | Stop reason: HOSPADM

## 2021-09-07 RX ORDER — SODIUM CHLORIDE, SODIUM LACTATE, POTASSIUM CHLORIDE, CALCIUM CHLORIDE 600; 310; 30; 20 MG/100ML; MG/100ML; MG/100ML; MG/100ML
100 INJECTION, SOLUTION INTRAVENOUS CONTINUOUS
Status: DISCONTINUED | OUTPATIENT
Start: 2021-09-07 | End: 2021-09-08 | Stop reason: HOSPADM

## 2021-09-07 RX ORDER — PROPOFOL 10 MG/ML
VIAL (ML) INTRAVENOUS AS NEEDED
Status: DISCONTINUED | OUTPATIENT
Start: 2021-09-07 | End: 2021-09-07 | Stop reason: SURG

## 2021-09-07 RX ADMIN — ACETAMINOPHEN 1000 MG: 500 TABLET, FILM COATED ORAL at 11:37

## 2021-09-07 RX ADMIN — HYDROCODONE BITARTRATE AND ACETAMINOPHEN 1 TABLET: 10; 325 TABLET ORAL at 22:00

## 2021-09-07 RX ADMIN — GABAPENTIN 600 MG: 300 CAPSULE ORAL at 19:16

## 2021-09-07 RX ADMIN — LIDOCAINE HYDROCHLORIDE 100 MG: 20 INJECTION, SOLUTION EPIDURAL; INFILTRATION; INTRACAUDAL; PERINEURAL at 12:25

## 2021-09-07 RX ADMIN — FENTANYL CITRATE 50 MCG: 50 INJECTION, SOLUTION INTRAMUSCULAR; INTRAVENOUS at 12:31

## 2021-09-07 RX ADMIN — ONDANSETRON 4 MG: 2 INJECTION INTRAMUSCULAR; INTRAVENOUS at 14:38

## 2021-09-07 RX ADMIN — GENTAMICIN SULFATE 510 MG: 40 INJECTION, SOLUTION INTRAMUSCULAR; INTRAVENOUS at 11:27

## 2021-09-07 RX ADMIN — HYDROCODONE BITARTRATE AND ACETAMINOPHEN 1 TABLET: 10; 325 TABLET ORAL at 18:26

## 2021-09-07 RX ADMIN — METOPROLOL TARTRATE 25 MG: 25 TABLET, FILM COATED ORAL at 20:33

## 2021-09-07 RX ADMIN — METOPROLOL TARTRATE 2 MG: 5 INJECTION INTRAVENOUS at 11:36

## 2021-09-07 RX ADMIN — SODIUM CHLORIDE, POTASSIUM CHLORIDE, SODIUM LACTATE AND CALCIUM CHLORIDE: 600; 310; 30; 20 INJECTION, SOLUTION INTRAVENOUS at 13:02

## 2021-09-07 RX ADMIN — SODIUM CHLORIDE, POTASSIUM CHLORIDE, SODIUM LACTATE AND CALCIUM CHLORIDE 1000 ML: 600; 310; 30; 20 INJECTION, SOLUTION INTRAVENOUS at 07:34

## 2021-09-07 RX ADMIN — HYDROMORPHONE HYDROCHLORIDE 1 MG: 1 INJECTION, SOLUTION INTRAMUSCULAR; INTRAVENOUS; SUBCUTANEOUS at 20:40

## 2021-09-07 RX ADMIN — CEFDINIR 300 MG: 300 CAPSULE ORAL at 20:33

## 2021-09-07 RX ADMIN — SODIUM CHLORIDE, POTASSIUM CHLORIDE, SODIUM LACTATE AND CALCIUM CHLORIDE 100 ML/HR: 600; 310; 30; 20 INJECTION, SOLUTION INTRAVENOUS at 18:20

## 2021-09-07 RX ADMIN — ONDANSETRON 4 MG: 2 INJECTION INTRAMUSCULAR; INTRAVENOUS at 12:43

## 2021-09-07 RX ADMIN — FENTANYL CITRATE 25 MCG: 50 INJECTION INTRAMUSCULAR; INTRAVENOUS at 14:10

## 2021-09-07 RX ADMIN — HYDROMORPHONE HYDROCHLORIDE 0.5 MG: 1 INJECTION, SOLUTION INTRAMUSCULAR; INTRAVENOUS; SUBCUTANEOUS at 13:55

## 2021-09-07 RX ADMIN — FENTANYL CITRATE 150 MCG: 50 INJECTION, SOLUTION INTRAMUSCULAR; INTRAVENOUS at 12:25

## 2021-09-07 RX ADMIN — PROPOFOL 150 MG: 10 INJECTION, EMULSION INTRAVENOUS at 12:25

## 2021-09-07 RX ADMIN — FENTANYL CITRATE 50 MCG: 50 INJECTION, SOLUTION INTRAMUSCULAR; INTRAVENOUS at 12:35

## 2021-09-07 RX ADMIN — OXYCODONE AND ACETAMINOPHEN 1 TABLET: 325; 10 TABLET ORAL at 14:38

## 2021-09-07 RX ADMIN — ONDANSETRON 4 MG: 2 INJECTION INTRAMUSCULAR; INTRAVENOUS at 08:10

## 2021-09-07 RX ADMIN — FUROSEMIDE 20 MG: 20 TABLET ORAL at 19:15

## 2021-09-07 RX ADMIN — FENTANYL CITRATE 25 MCG: 50 INJECTION INTRAMUSCULAR; INTRAVENOUS at 14:39

## 2021-09-07 RX ADMIN — MIDAZOLAM HYDROCHLORIDE 1 MG: 1 INJECTION, SOLUTION INTRAMUSCULAR; INTRAVENOUS at 11:37

## 2021-09-07 RX ADMIN — FINASTERIDE 5 MG: 5 TABLET, FILM COATED ORAL at 19:15

## 2021-09-07 RX ADMIN — KETOROLAC TROMETHAMINE 30 MG: 30 INJECTION, SOLUTION INTRAMUSCULAR; INTRAVENOUS at 08:09

## 2021-09-07 NOTE — ANESTHESIA POSTPROCEDURE EVALUATION
Patient: Jalil Darby    Procedure Summary     Date: 09/07/21 Room / Location:  [facility]  /  [facility]    Anesthesia Start: 1220 Anesthesia Stop: 1321    Procedure: CYSTOSCOPY TRANSURETHRAL RESECTION OF PROSTATE (N/A Bladder) Diagnosis:       Benign prostatic hyperplasia (BPH) with straining on urination      (Benign prostatic hyperplasia (BPH) with straining on urination [N40.1, R39.16])    Surgeons: Ervin Alonzo MD Provider: Jarvis Ramos CRNA    Anesthesia Type: general ASA Status: 3          Anesthesia Type: general    Vitals  Vitals Value Taken Time   /77 09/07/21 1740   Temp 97.9 °F (36.6 °C) 09/07/21 1740   Pulse 72 09/07/21 1741   Resp 14 09/07/21 1740   SpO2 94 % 09/07/21 1741   Vitals shown include unvalidated device data.        Post Anesthesia Care and Evaluation    Patient location during evaluation: PACU  Patient participation: complete - patient participated  Level of consciousness: awake and alert  Pain management: adequate  Airway patency: patent  Anesthetic complications: No anesthetic complications    Cardiovascular status: acceptable  Respiratory status: acceptable  Hydration status: acceptable    Comments: Blood pressure 106/59, pulse 65, temperature 98.5 °F (36.9 °C), temperature source Oral, resp. rate 16, SpO2 97 %.    Pt discharged from PACU based on selina score >8

## 2021-09-07 NOTE — OP NOTE
Operative Summary    Jalil Darby  Date of Procedure: 9/7/2021    Pre-op Diagnosis:   Benign prostatic hyperplasia (BPH) with straining on urination [N40.1, R39.16]    Post-op Diagnosis:     Post-Op Diagnosis Codes:     * Benign prostatic hyperplasia (BPH) with straining on urination [N40.1, R39.16]    Procedure/CPT® Codes:      Procedure(s):  CYSTOSCOPY TRANSURETHRAL RESECTION OF PROSTATE    Surgeon(s):  Ervin Alonzo MD    Anesthesia: General    Staff:   Circulator: Claribel Kraft RN; Griselda Blanca RN  Scrub Person: Gisell Del Rio  Assistant: Richi Fuentes    Indications for procedure:  61-year-old male with history hematuria now with urinary retention with indwelling catheter.  He has history of enlarged prostate and desires to proceed with cystoscopy under anesthesia and transurethral resection of prostate    Findings:   Bilobar prostatic hypertrophy with elevated bladder neck.  Inflammatory catheter changes along posterior bladder wall.  No discrete papillary tumor.  No bladder stone    Procedure details:  After appropriate anesthesia, positioning, prep and drape, timeout protocol was observed.     The urethra is calibrated with [place] urethral sounds to 30 [place]. There was no need to dilate the urethra based on this calibration for passage of the resectoscope sheath. At this point I passed the well lubricated [employer] Gyrus 27 Frisian continuous flow resectoscope with obturator into the bladder.  I did confirm the saline was to be hung as the irrigant so that I could perform a bipolar cautery procedure to reduce the risk of TUR syndrome with fluid overload and dilutional hyponatremia.    Using the 30° lens I inspected the urinary bladder.  The bladder was moderately trabeculated. The absence of diverticula and cellules were noted. No mucosal lesions were present. The ureteral orifices were Orthotopic in location , Normal in configuration and Effluxed relatively clear urine.  Prostatic urethra is  inspected carefully. There was no evidence of a significant median lobe.  There was a median bar with elevated bladder neck.  I therefore first used the button electrode to perform transurethral incision of the prostate from the bladder neck to the verumontanum at 5 and 7:00.  This allowed the bladder neck to spring open nicely.  I then transition to the resecting loop to perform transurethral resection of the prostate beginning with the left lateral lobe from the bladder neck to the verumontanum.    I turned my attention to the ventral aspect and lateral lobes.  The capsule is identified at 5 and then 7 o'clock anatomically and this resection is carried out to the level to the verumontanum but not beyond.  I used this as the landmark of the sphincter.  This set up the floor of the prostate nicely.  I resected from bladder neck out to the veru with care being taken not to undermine the bladder neck.  The lateral lobes were now nicely defined.  I resected the right lateral lobe initially from bladder neck out to the verumontanum in a clockwise fashion from the anatomic 7 o'clock to 10 o'clock.  I resected to the level of the capsule again using cautery to minimize bleeding.  This was followed by resecting the left lateral lobe in a counterclockwise fashion from 5 o'clock to 2 o'clock.  Again care was taken not to go beyond the verumontanum.  The majority of the time spent on this resection was at the lateral lobes.  At the conclusion of this the prostatic urethra did appear wide open.    Lastly I did resect some apical tissue at the bladder neck out to the midportion of the prostatic urethra.  I minimize resection at the level of the sphincter because it is less defined anteriorly.  At the conclusion of this I used an [employer] evacuator as well as the scope with loop to remove any prostate chips.  Any active bleeding was noted and cauterized.  There were no obvious open sinuses.    At the conclusion I took a catheter  guide with a 24 Mal[place]ian 3-way Sellers and manipulated this into the bladder.  Hand irrigation with saline is performed to be sure there are no significant clots or remaining prostate chips.  I then connected the saline bladder irrigation to the tubing to initiate continuous bladder irrigation.  The balloon was filled with  30 mL of sterile water. The catheter was left to gravity drainage.     The patient was transferred from the operating room to the recovery room in stable condition.    Estimated Blood Loss: <30 mL    Specimens:                Specimens     ID Source Type Tests Collected By Collected At Frozen?    A Prostate Tissue · TISSUE PATHOLOGY EXAM   Ervin Alonzo MD 9/7/21 0735     Description: PROSTATE CHIPS            Drains:   Continuous Bladder Irrigation Triple-lumen 24 Fr (Active)   Daily Indications Continuous Bladder Irrigation 09/07/21 1330   Site Assessment Clean;Skin intact 09/07/21 1318   Collection Container Standard drainage bag 09/07/21 1318   Securement Method Securing device 09/07/21 1330   Patient Tolerance of Continuous Bladder Irrigation Tolerating well 09/07/21 1318   Rate Fast 09/07/21 1330   Irrigant Normal saline 09/07/21 1330       [REMOVED] Urethral Catheter (Removed)   Site Assessment Clean;Skin intact 09/07/21 0742   Collection Container Leg bag 09/07/21 0742   Securement Method Leg strap 09/07/21 0742       Complications: none    Ervin Alonzo MD     Date: 9/7/2021  Time: 13:38 CDT

## 2021-09-07 NOTE — PLAN OF CARE
Goal Outcome Evaluation:           Progress: improving  Outcome Summary: SURGERY TODAY. 3 WAY NGUYEN PATENT WITH CBI INFUSING. URINE PINK TINGED. ROOM AIR. PRN PAIN MED AVAILABLE AND PT. HAS REQUIRED THIS SHIFT. SCD'S ON. REGULAR DIET. TOLERATED DIET OK. NO DISTRESS NOTED.

## 2021-09-07 NOTE — ANESTHESIA PROCEDURE NOTES
Airway  Date/Time: 9/7/2021 12:25 PM  Airway not difficult    General Information and Staff    Patient location during procedure: OR  CRNA: Jarvis Ramos CRNA    Indications and Patient Condition  Indications for airway management: airway protection    Preoxygenated: yes  Mask difficulty assessment: 0 - not attempted    Final Airway Details  Final airway type: supraglottic airway      Successful airway: Supreme  Size 4    Number of attempts at approach: 1  Assessment: lips, teeth, and gum same as pre-op and atraumatic intubation

## 2021-09-07 NOTE — ANESTHESIA PREPROCEDURE EVALUATION
Anesthesia Evaluation     no history of anesthetic complications:  NPO Solid Status: > 8 hours  NPO Liquid Status: > 8 hours           Airway   Mallampati: I  TM distance: >3 FB  Neck ROM: full  No difficulty expected  Dental      Pulmonary    (+) a smoker Current, shortness of breath,   Cardiovascular   Exercise tolerance: poor (<4 METS)    Patient on routine beta blocker and Beta blocker given within 24 hours of surgery    (+) hypertension, CAD, cardiac stents (two stents, most recent 15 years) more than 12 months ago     ROS comment: Low risk stress test 2018    Neuro/Psych  (-) seizures, TIA, CVA  GI/Hepatic/Renal/Endo    (+)   diabetes mellitus (borderline),   (-) liver disease, no renal disease    Musculoskeletal     (+) back pain,   Abdominal    Substance History      OB/GYN          Other                        Anesthesia Plan    ASA 3     general     intravenous induction     Anesthetic plan, all risks, benefits, and alternatives have been provided, discussed and informed consent has been obtained with: patient.

## 2021-09-08 VITALS
HEIGHT: 70 IN | DIASTOLIC BLOOD PRESSURE: 79 MMHG | OXYGEN SATURATION: 95 % | SYSTOLIC BLOOD PRESSURE: 121 MMHG | BODY MASS INDEX: 30.36 KG/M2 | TEMPERATURE: 98.3 F | RESPIRATION RATE: 16 BRPM | HEART RATE: 83 BPM | WEIGHT: 212.08 LBS

## 2021-09-08 PROCEDURE — 63710000001 HYDROCODONE-ACETAMINOPHEN 10-325 MG TABLET: Performed by: UROLOGY

## 2021-09-08 PROCEDURE — A9270 NON-COVERED ITEM OR SERVICE: HCPCS | Performed by: UROLOGY

## 2021-09-08 PROCEDURE — 63710000001 CEFDINIR 300 MG CAPSULE: Performed by: UROLOGY

## 2021-09-08 PROCEDURE — 63710000001 METOPROLOL TARTRATE 25 MG TABLET: Performed by: UROLOGY

## 2021-09-08 PROCEDURE — 94799 UNLISTED PULMONARY SVC/PX: CPT

## 2021-09-08 PROCEDURE — 25010000003 HYDROMORPHONE 1 MG/ML SOLUTION: Performed by: UROLOGY

## 2021-09-08 PROCEDURE — 99024 POSTOP FOLLOW-UP VISIT: CPT | Performed by: UROLOGY

## 2021-09-08 PROCEDURE — 63710000001 GABAPENTIN 300 MG CAPSULE: Performed by: UROLOGY

## 2021-09-08 RX ORDER — PHENAZOPYRIDINE HYDROCHLORIDE 100 MG/1
100 TABLET, FILM COATED ORAL 3 TIMES DAILY PRN
Qty: 20 TABLET | Refills: 1 | Status: SHIPPED | OUTPATIENT
Start: 2021-09-08 | End: 2022-04-04

## 2021-09-08 RX ORDER — DOCUSATE SODIUM 100 MG/1
100 CAPSULE, LIQUID FILLED ORAL 2 TIMES DAILY
Qty: 60 CAPSULE | Refills: 0 | Status: SHIPPED | OUTPATIENT
Start: 2021-09-08

## 2021-09-08 RX ORDER — HYDROCODONE BITARTRATE AND ACETAMINOPHEN 5; 325 MG/1; MG/1
1 TABLET ORAL EVERY 6 HOURS PRN
Qty: 12 TABLET | Refills: 0 | Status: SHIPPED | OUTPATIENT
Start: 2021-09-08

## 2021-09-08 RX ORDER — SULFAMETHOXAZOLE AND TRIMETHOPRIM 800; 160 MG/1; MG/1
1 TABLET ORAL 2 TIMES DAILY
Qty: 10 TABLET | Refills: 0 | Status: SHIPPED | OUTPATIENT
Start: 2021-09-08 | End: 2021-09-13

## 2021-09-08 RX ORDER — ONDANSETRON 4 MG/1
4 TABLET, ORALLY DISINTEGRATING ORAL EVERY 6 HOURS PRN
Qty: 6 TABLET | Refills: 1 | Status: SHIPPED | OUTPATIENT
Start: 2021-09-08 | End: 2021-10-18 | Stop reason: SDUPTHER

## 2021-09-08 RX ORDER — OXYBUTYNIN CHLORIDE 5 MG/1
5 TABLET ORAL EVERY 8 HOURS PRN
Qty: 30 TABLET | Refills: 1 | Status: SHIPPED | OUTPATIENT
Start: 2021-09-08 | End: 2022-04-04

## 2021-09-08 RX ADMIN — HYDROCODONE BITARTRATE AND ACETAMINOPHEN 1 TABLET: 10; 325 TABLET ORAL at 08:21

## 2021-09-08 RX ADMIN — METOPROLOL TARTRATE 25 MG: 25 TABLET, FILM COATED ORAL at 08:21

## 2021-09-08 RX ADMIN — CEFDINIR 300 MG: 300 CAPSULE ORAL at 08:21

## 2021-09-08 RX ADMIN — HYDROMORPHONE HYDROCHLORIDE 1 MG: 1 INJECTION, SOLUTION INTRAMUSCULAR; INTRAVENOUS; SUBCUTANEOUS at 00:24

## 2021-09-08 RX ADMIN — HYDROMORPHONE HYDROCHLORIDE 1 MG: 1 INJECTION, SOLUTION INTRAMUSCULAR; INTRAVENOUS; SUBCUTANEOUS at 06:26

## 2021-09-08 RX ADMIN — GABAPENTIN 600 MG: 300 CAPSULE ORAL at 08:21

## 2021-09-08 RX ADMIN — SODIUM CHLORIDE, POTASSIUM CHLORIDE, SODIUM LACTATE AND CALCIUM CHLORIDE 100 ML/HR: 600; 310; 30; 20 INJECTION, SOLUTION INTRAVENOUS at 04:46

## 2021-09-08 RX ADMIN — HYDROCODONE BITARTRATE AND ACETAMINOPHEN 1 TABLET: 10; 325 TABLET ORAL at 03:18

## 2021-09-08 NOTE — DISCHARGE SUMMARY
Date of Discharge:  9/8/2021    Discharge Diagnosis:   #1.  BPH status post TURP    Presenting Problem/History of Present Illness  Benign prostatic hyperplasia (BPH) with straining on urination [N40.1, R39.16]       Hospital Course  Patient is a 61 y.o. male presented with catheter dependent urinary retention and history of hematuria.  He tolerated surgery well and recovered uneventfully on the regular floor.  He remained afebrile with stable vital signs.  He tolerated oral diet.  Pain was controlled with oral analgesia.  On postoperative day 1, his urine remained clear with the CBI clamped.  He desired to go home.      Procedures Performed  Procedure(s):  CYSTOSCOPY TRANSURETHRAL RESECTION OF PROSTATE       Consults:   Consults     No orders found for last 30 day(s).          Condition on Discharge:  stable    Vital Signs  Temp:  [97.5 °F (36.4 °C)-98.5 °F (36.9 °C)] 98.3 °F (36.8 °C)  Heart Rate:  [61-93] 83  Resp:  [12-18] 16  BP: (102-126)/(54-79) 121/79      Discharge Disposition  Home or Self Care    Discharge Medications     Discharge Medications      New Medications      Instructions Start Date   docusate sodium 100 MG capsule  Commonly known as: Colace   100 mg, Oral, 2 Times Daily      phenazopyridine 100 MG tablet  Commonly known as: PYRIDIUM   100 mg, Oral, 3 Times Daily PRN      sulfamethoxazole-trimethoprim 800-160 MG per tablet  Commonly known as: BACTRIM DS,SEPTRA DS   1 tablet, Oral, 2 Times Daily         Changes to Medications      Instructions Start Date   HYDROcodone-acetaminophen 5-325 MG per tablet  Commonly known as: [place]  What changed: Another medication with the same name was added. Make sure you understand how and when to take each.   1 tablet, Oral, Every 6 Hours PRN      HYDROcodone-acetaminophen 5-325 MG per tablet  Commonly known as: NORCO  What changed: You were already taking a medication with the same name, and this prescription was added. Make sure you understand how and when to  take each.   1 tablet, Oral, Every 6 Hours PRN      ondansetron ODT 4 MG disintegrating tablet  Commonly known as: Zofran ODT  What changed: Another medication with the same name was added. Make sure you understand how and when to take each.   4 mg, Translingual, Every 8 Hours PRN      ondansetron ODT 4 MG disintegrating tablet  Commonly known as: Zofran ODT  What changed: You were already taking a medication with the same name, and this prescription was added. Make sure you understand how and when to take each.   4 mg, Translingual, Every 6 Hours PRN      oxybutynin 5 MG tablet  Commonly known as: DITROPAN  What changed: Another medication with the same name was added. Make sure you understand how and when to take each.   5 mg, Oral, 3 Times Daily      oxybutynin 5 MG tablet  Commonly known as: DITROPAN  What changed: You were already taking a medication with the same name, and this prescription was added. Make sure you understand how and when to take each.   5 mg, Oral, Every 8 Hours PRN         Continue These Medications      Instructions Start Date   albuterol sulfate  (90 Base) MCG/ACT inhaler  Commonly known as: PROVENTIL HFA;VENTOLIN HFA;PROAIR HFA   2 puffs, Inhalation, Every 4 Hours PRN      fluconazole 200 MG tablet  Commonly known as: Diflucan   200 mg, Oral, Daily      furosemide 20 MG tablet  Commonly known as: LASIX   TAKE 1 TABLET BY MOUTH EVERY DAY      gabapentin 300 MG capsule  Commonly known as: NEURONTIN   600 mg, Oral, 4 Times Daily      glucose blood test strip   Use as instructed      glucose monitor monitoring kit   1 each, Does not apply, 4 Times Daily Before Meals & Nightly      Lancets 30G misc   1 each, Does not apply, 4 Times Daily Before Meals & Nightly      metoprolol tartrate 25 MG tablet  Commonly known as: LOPRESSOR   TAKE 1 TABLET BY MOUTH TWICE A DAY      nystatin 235987 UNIT/ML suspension  Commonly known as: MYCOSTATIN   500,000 Units, Swish & Swallow, 4 Times Daily          Stop These Medications    aspirin 81 MG chewable tablet     cefdinir 300 MG capsule  Commonly known as: OMNICEF     finasteride 5 MG tablet  Commonly known as: PROSCAR        May restart aspirin in 2 weeks.     Discharge Diet:   Diet Instructions     Advance Diet as Tolerated            Activity at Discharge:   Activity Instructions     Discharge Activity      1) No driving for  1 week and until no longer taking narcotics.   2) May shower tonight.   3) Do not lift / push / pull more than 20 pounds for 2 weeks.          Follow-up Appointments  No future appointments.  Additional Instructions for the Follow-ups that You Need to Schedule     Discharge Follow-up with Specified Provider: Urology nurse visit Monday morning 9/13 for fill and pull/voiding trial and f/u with Dr. Alonzo in [facility] on 12/9/21   As directed      To: Urology nurse visit Monday morning 9/13 for fill and pull/voiding trial and f/u with Dr. Alonzo in [facility] on 12/9/21               Test Results Pending at Discharge  Pending Labs     Order Current Status    Tissue Pathology Exam In process           Ervin Alonzo MD  09/08/21  09:43 CDT    Time: Discharge 25 min

## 2021-09-08 NOTE — PLAN OF CARE
Problem: Adult Inpatient Plan of Care  Goal: Plan of Care Review  Outcome: Ongoing, Progressing  Flowsheets (Taken 9/8/2021 9209)  Progress: no change  Plan of Care Reviewed With: patient  Outcome Summary: Pt complains of pain on the L side of his back, see MAR. Pt does state he struggles with chronic back pain.  No complaints of nausea. CBI infusing at a slow rate, plan to turn it off at 0500 per order. SCDs. IVF. Oral abx. Safety maintained. Ambulated in the gallegos. Up in the chair.       Mrn#[id number]

## 2021-09-09 LAB
CYTO UR: NORMAL
LAB AP CASE REPORT: NORMAL
PATH REPORT.FINAL DX SPEC: NORMAL
PATH REPORT.GROSS SPEC: NORMAL

## 2021-09-16 ENCOUNTER — TELEPHONE (OUTPATIENT)
Dept: INTERNAL MEDICINE | Facility: CLINIC | Age: 61
End: 2021-09-16

## 2021-09-16 NOTE — TELEPHONE ENCOUNTER
Returned call to patient to discuss post surgery symptoms. I spoke with Dr. Alonzo nurse who states that the symptoms he was experiencing were common after the surgery that he had. He voiced understanding, but then asked about what he should do with his fever and coughing. I told him I wasn't aware of those symptoms per the note I received. He proceeded to tell me that since coming home from his surgery last Thursday, he has been running fever, and coughing. He states he feels bad all over. As we talked, he begins to cough uncontrollably and starts gasping for air. I asked him if he had a pulse oximeter. He states that he does. He called for his spouse to bring it to him, but he was unable to get it to work. His spouse eventually took the phone as the patient was unable to talk. Based on his symptoms of uncontrolled fevers and shortness of breath, I advised he should go to the ER. She voiced understanding and states she will get him there as soon as possible.

## 2021-09-16 NOTE — TELEPHONE ENCOUNTER
DELETE AFTER REVIEWING: Telephone encounter to be sent to the  pool     Caller: Kenrick Darbyy    Relationship: Self    Best call back number: [phone]    What is the best time to reach you:    ANYTIME    Who are you requesting to speak with     CLINICAL STAFF (DR SAAVEDRA)      Do you know the name of the person who called:     PATIENT    What was the call regarding:    PATIENT DOES NOT WANT A MY CHART OR TELEPHONE VISIT. WANTING DR SAAVEDRA TO CALL HIM. HE HAD PROSTATE SURGERY LAST WEEK. URINATING OVER HIMSELF AND DOES NOT WANT TO GO OUT    Do you require a callback:     PLEASE ADVISE

## 2021-09-27 NOTE — TELEPHONE ENCOUNTER
Rx Refill Note  Requested Prescriptions     Pending Prescriptions Disp Refills   • furosemide (LASIX) 20 MG tablet [Pharmacy Med Name: FUROSEMIDE 20 MG TABLET] 30 tablet 0     Sig: TAKE 1 TABLET BY MOUTH EVERY DAY     Med last filled: 9/3/21   Last office visit with prescribing clinician: 9/2/2021      Next office visit with prescribing clinician: Visit date not found- Want FU apt scheduled?             Johana Campbell RN  09/27/21, 08:19 CDT

## 2021-09-28 RX ORDER — FUROSEMIDE 20 MG/1
TABLET ORAL
Qty: 30 TABLET | Refills: 0 | Status: SHIPPED | OUTPATIENT
Start: 2021-09-28 | End: 2021-10-27

## 2021-10-06 ENCOUNTER — APPOINTMENT (OUTPATIENT)
Dept: ULTRASOUND IMAGING | Facility: HOSPITAL | Age: 61
End: 2021-10-06

## 2021-10-06 ENCOUNTER — OFFICE VISIT (OUTPATIENT)
Dept: INTERNAL MEDICINE | Facility: CLINIC | Age: 61
End: 2021-10-06

## 2021-10-06 ENCOUNTER — HOSPITAL ENCOUNTER (EMERGENCY)
Facility: HOSPITAL | Age: 61
Discharge: HOME | End: 2021-10-06
Attending: FAMILY MEDICINE | Admitting: FAMILY MEDICINE

## 2021-10-06 ENCOUNTER — APPOINTMENT (OUTPATIENT)
Dept: GENERAL RADIOLOGY | Facility: HOSPITAL | Age: 61
End: 2021-10-06

## 2021-10-06 ENCOUNTER — APPOINTMENT (OUTPATIENT)
Dept: CT IMAGING | Facility: HOSPITAL | Age: 61
End: 2021-10-06

## 2021-10-06 VITALS
WEIGHT: 208 LBS | SYSTOLIC BLOOD PRESSURE: 126 MMHG | BODY MASS INDEX: 29.78 KG/M2 | OXYGEN SATURATION: 98 % | HEIGHT: 70 IN | DIASTOLIC BLOOD PRESSURE: 69 MMHG | TEMPERATURE: 98.5 F | HEART RATE: 89 BPM | RESPIRATION RATE: 16 BRPM

## 2021-10-06 VITALS
HEIGHT: 70 IN | RESPIRATION RATE: 20 BRPM | HEART RATE: 100 BPM | DIASTOLIC BLOOD PRESSURE: 64 MMHG | OXYGEN SATURATION: 98 % | TEMPERATURE: 98.6 F | WEIGHT: 212.1 LBS | SYSTOLIC BLOOD PRESSURE: 128 MMHG | BODY MASS INDEX: 30.37 KG/M2

## 2021-10-06 DIAGNOSIS — R10.32 LEFT LOWER QUADRANT ABDOMINAL PAIN: Primary | ICD-10-CM

## 2021-10-06 DIAGNOSIS — N45.2 ORCHITIS: Primary | ICD-10-CM

## 2021-10-06 DIAGNOSIS — R10.32 LEFT INGUINAL PAIN: ICD-10-CM

## 2021-10-06 LAB
ALBUMIN SERPL-MCNC: 4.2 G/DL (ref 3.5–5.2)
ALBUMIN/GLOB SERPL: 1.2 G/DL
ALP SERPL-CCNC: 146 U/L (ref 39–117)
ALT SERPL W P-5'-P-CCNC: 14 U/L (ref 1–41)
ANION GAP SERPL CALCULATED.3IONS-SCNC: 11 MMOL/L (ref 5–15)
APTT PPP: 29.7 SECONDS (ref 24.1–35)
AST SERPL-CCNC: 16 U/L (ref 1–40)
BACTERIA UR QL AUTO: ABNORMAL /HPF
BASOPHILS # BLD AUTO: 0.06 10*3/MM3 (ref 0–0.2)
BASOPHILS NFR BLD AUTO: 0.3 % (ref 0–1.5)
BILIRUB SERPL-MCNC: 0.8 MG/DL (ref 0–1.2)
BILIRUB UR QL STRIP: NEGATIVE
BUN SERPL-MCNC: 11 MG/DL (ref 8–23)
BUN/CREAT SERPL: 11.2 (ref 7–25)
CALCIUM SPEC-SCNC: 9.3 MG/DL (ref 8.6–10.5)
CHLORIDE SERPL-SCNC: 96 MMOL/L (ref 98–107)
CLARITY UR: CLEAR
CO2 SERPL-SCNC: 25 MMOL/L (ref 22–29)
COLOR UR: YELLOW
CREAT SERPL-MCNC: 0.98 MG/DL (ref 0.76–1.27)
D-LACTATE SERPL-SCNC: 1.7 MMOL/L (ref 0.5–2)
DEPRECATED RDW RBC AUTO: 43 FL (ref 37–54)
EOSINOPHIL # BLD AUTO: 0 10*3/MM3 (ref 0–0.4)
EOSINOPHIL NFR BLD AUTO: 0 % (ref 0.3–6.2)
ERYTHROCYTE [DISTWIDTH] IN BLOOD BY AUTOMATED COUNT: 13.7 % (ref 12.3–15.4)
GFR SERPL CREATININE-BSD FRML MDRD: 77 ML/MIN/1.73
GLOBULIN UR ELPH-MCNC: 3.5 GM/DL
GLUCOSE SERPL-MCNC: 157 MG/DL (ref 65–99)
GLUCOSE UR STRIP-MCNC: NEGATIVE MG/DL
HCT VFR BLD AUTO: 42.2 % (ref 37.5–51)
HGB BLD-MCNC: 14.2 G/DL (ref 13–17.7)
HGB UR QL STRIP.AUTO: ABNORMAL
HYALINE CASTS UR QL AUTO: ABNORMAL /LPF
IMM GRANULOCYTES # BLD AUTO: 0.31 10*3/MM3 (ref 0–0.05)
IMM GRANULOCYTES NFR BLD AUTO: 1.4 % (ref 0–0.5)
INR PPP: 0.97 (ref 0.91–1.09)
KETONES UR QL STRIP: NEGATIVE
LEUKOCYTE ESTERASE UR QL STRIP.AUTO: ABNORMAL
LIPASE SERPL-CCNC: 21 U/L (ref 13–60)
LYMPHOCYTES # BLD AUTO: 0.51 10*3/MM3 (ref 0.7–3.1)
LYMPHOCYTES NFR BLD AUTO: 2.3 % (ref 19.6–45.3)
MAGNESIUM SERPL-MCNC: 1.6 MG/DL (ref 1.6–2.4)
MCH RBC QN AUTO: 29 PG (ref 26.6–33)
MCHC RBC AUTO-ENTMCNC: 33.6 G/DL (ref 31.5–35.7)
MCV RBC AUTO: 86.3 FL (ref 79–97)
MONOCYTES # BLD AUTO: 1.39 10*3/MM3 (ref 0.1–0.9)
MONOCYTES NFR BLD AUTO: 6.1 % (ref 5–12)
NEUTROPHILS NFR BLD AUTO: 20.34 10*3/MM3 (ref 1.7–7)
NEUTROPHILS NFR BLD AUTO: 89.9 % (ref 42.7–76)
NITRITE UR QL STRIP: NEGATIVE
NRBC BLD AUTO-RTO: 0 /100 WBC (ref 0–0.2)
PH UR STRIP.AUTO: >=9 [PH] (ref 5–8)
PLATELET # BLD AUTO: 338 10*3/MM3 (ref 140–450)
PMV BLD AUTO: 9.8 FL (ref 6–12)
POTASSIUM SERPL-SCNC: 4.6 MMOL/L (ref 3.5–5.2)
PROT SERPL-MCNC: 7.7 G/DL (ref 6–8.5)
PROT UR QL STRIP: ABNORMAL
PROTHROMBIN TIME: 12.5 SECONDS (ref 11.9–14.6)
RBC # BLD AUTO: 4.89 10*6/MM3 (ref 4.14–5.8)
RBC # UR: ABNORMAL /HPF
REF LAB TEST METHOD: ABNORMAL
SODIUM SERPL-SCNC: 132 MMOL/L (ref 136–145)
SP GR UR STRIP: 1.02 (ref 1–1.03)
SQUAMOUS #/AREA URNS HPF: ABNORMAL /HPF
UROBILINOGEN UR QL STRIP: ABNORMAL
WBC # BLD AUTO: 22.61 10*3/MM3 (ref 3.4–10.8)
WBC UR QL AUTO: ABNORMAL /HPF

## 2021-10-06 PROCEDURE — 74177 CT ABD & PELVIS W/CONTRAST: CPT

## 2021-10-06 PROCEDURE — 96376 TX/PRO/DX INJ SAME DRUG ADON: CPT

## 2021-10-06 PROCEDURE — 85610 PROTHROMBIN TIME: CPT | Performed by: FAMILY MEDICINE

## 2021-10-06 PROCEDURE — 99214 OFFICE O/P EST MOD 30 MIN: CPT | Performed by: FAMILY MEDICINE

## 2021-10-06 PROCEDURE — 83605 ASSAY OF LACTIC ACID: CPT | Performed by: FAMILY MEDICINE

## 2021-10-06 PROCEDURE — 81001 URINALYSIS AUTO W/SCOPE: CPT | Performed by: FAMILY MEDICINE

## 2021-10-06 PROCEDURE — 25010000002 KETOROLAC TROMETHAMINE PER 15 MG: Performed by: FAMILY MEDICINE

## 2021-10-06 PROCEDURE — 96366 THER/PROPH/DIAG IV INF ADDON: CPT

## 2021-10-06 PROCEDURE — 96365 THER/PROPH/DIAG IV INF INIT: CPT

## 2021-10-06 PROCEDURE — 25010000002 DIPHENHYDRAMINE PER 50 MG: Performed by: FAMILY MEDICINE

## 2021-10-06 PROCEDURE — 83690 ASSAY OF LIPASE: CPT | Performed by: FAMILY MEDICINE

## 2021-10-06 PROCEDURE — 71045 X-RAY EXAM CHEST 1 VIEW: CPT

## 2021-10-06 PROCEDURE — 25010000002 IOPAMIDOL 61 % SOLUTION: Performed by: FAMILY MEDICINE

## 2021-10-06 PROCEDURE — 25010000002 METHYLPREDNISOLONE PER 40 MG: Performed by: FAMILY MEDICINE

## 2021-10-06 PROCEDURE — 83735 ASSAY OF MAGNESIUM: CPT | Performed by: FAMILY MEDICINE

## 2021-10-06 PROCEDURE — 25010000003 HYDROMORPHONE 1 MG/ML SOLUTION: Performed by: FAMILY MEDICINE

## 2021-10-06 PROCEDURE — 85730 THROMBOPLASTIN TIME PARTIAL: CPT | Performed by: FAMILY MEDICINE

## 2021-10-06 PROCEDURE — 85025 COMPLETE CBC W/AUTO DIFF WBC: CPT | Performed by: FAMILY MEDICINE

## 2021-10-06 PROCEDURE — 25010000003 AMPICILLIN-SULBACTAM PER 1.5 G: Performed by: FAMILY MEDICINE

## 2021-10-06 PROCEDURE — 87086 URINE CULTURE/COLONY COUNT: CPT | Performed by: FAMILY MEDICINE

## 2021-10-06 PROCEDURE — 87040 BLOOD CULTURE FOR BACTERIA: CPT | Performed by: FAMILY MEDICINE

## 2021-10-06 PROCEDURE — 76870 US EXAM SCROTUM: CPT

## 2021-10-06 PROCEDURE — 25010000002 ONDANSETRON PER 1 MG: Performed by: FAMILY MEDICINE

## 2021-10-06 PROCEDURE — 25010000002 CEFTRIAXONE PER 250 MG: Performed by: FAMILY MEDICINE

## 2021-10-06 PROCEDURE — 96375 TX/PRO/DX INJ NEW DRUG ADDON: CPT

## 2021-10-06 PROCEDURE — 99284 EMERGENCY DEPT VISIT MOD MDM: CPT

## 2021-10-06 PROCEDURE — 96367 TX/PROPH/DG ADDL SEQ IV INF: CPT

## 2021-10-06 PROCEDURE — 80053 COMPREHEN METABOLIC PANEL: CPT | Performed by: FAMILY MEDICINE

## 2021-10-06 RX ORDER — ONDANSETRON 2 MG/ML
4 INJECTION INTRAMUSCULAR; INTRAVENOUS ONCE
Status: COMPLETED | OUTPATIENT
Start: 2021-10-06 | End: 2021-10-06

## 2021-10-06 RX ORDER — KETOROLAC TROMETHAMINE 15 MG/ML
15 INJECTION, SOLUTION INTRAMUSCULAR; INTRAVENOUS ONCE
Status: COMPLETED | OUTPATIENT
Start: 2021-10-06 | End: 2021-10-06

## 2021-10-06 RX ORDER — CEFDINIR 300 MG/1
300 CAPSULE ORAL 2 TIMES DAILY
Qty: 28 CAPSULE | Refills: 0 | Status: SHIPPED | OUTPATIENT
Start: 2021-10-06 | End: 2021-10-28

## 2021-10-06 RX ORDER — DIPHENHYDRAMINE HYDROCHLORIDE 50 MG/ML
50 INJECTION INTRAMUSCULAR; INTRAVENOUS ONCE
Status: COMPLETED | OUTPATIENT
Start: 2021-10-06 | End: 2021-10-06

## 2021-10-06 RX ORDER — DOXYCYCLINE 100 MG/1
100 CAPSULE ORAL 2 TIMES DAILY
Qty: 28 CAPSULE | Refills: 0 | Status: SHIPPED | OUTPATIENT
Start: 2021-10-06 | End: 2021-10-06

## 2021-10-06 RX ORDER — METHYLPREDNISOLONE SODIUM SUCCINATE 40 MG/ML
40 INJECTION, POWDER, LYOPHILIZED, FOR SOLUTION INTRAMUSCULAR; INTRAVENOUS EVERY 4 HOURS
Status: DISCONTINUED | OUTPATIENT
Start: 2021-10-06 | End: 2021-10-06 | Stop reason: HOSPADM

## 2021-10-06 RX ORDER — IBUPROFEN 600 MG/1
600 TABLET ORAL EVERY 6 HOURS PRN
Qty: 20 TABLET | Refills: 0 | Status: SHIPPED | OUTPATIENT
Start: 2021-10-06 | End: 2021-11-02

## 2021-10-06 RX ORDER — SODIUM CHLORIDE 9 MG/ML
125 INJECTION, SOLUTION INTRAVENOUS CONTINUOUS
Status: DISCONTINUED | OUTPATIENT
Start: 2021-10-06 | End: 2021-10-06 | Stop reason: HOSPADM

## 2021-10-06 RX ADMIN — SODIUM CHLORIDE 500 ML: 9 INJECTION, SOLUTION INTRAVENOUS at 13:07

## 2021-10-06 RX ADMIN — SODIUM CHLORIDE 1 G: 9 INJECTION, SOLUTION INTRAVENOUS at 18:35

## 2021-10-06 RX ADMIN — SODIUM CHLORIDE 125 ML/HR: 9 INJECTION, SOLUTION INTRAVENOUS at 14:53

## 2021-10-06 RX ADMIN — AMPICILLIN SODIUM AND SULBACTAM SODIUM 3 G: 2; 1 INJECTION, POWDER, FOR SOLUTION INTRAMUSCULAR; INTRAVENOUS at 14:53

## 2021-10-06 RX ADMIN — KETOROLAC TROMETHAMINE 15 MG: 15 INJECTION, SOLUTION INTRAMUSCULAR; INTRAVENOUS at 18:33

## 2021-10-06 RX ADMIN — ONDANSETRON 4 MG: 2 INJECTION INTRAMUSCULAR; INTRAVENOUS at 13:12

## 2021-10-06 RX ADMIN — HYDROMORPHONE HYDROCHLORIDE 1 MG: 1 INJECTION, SOLUTION INTRAMUSCULAR; INTRAVENOUS; SUBCUTANEOUS at 14:52

## 2021-10-06 RX ADMIN — DIPHENHYDRAMINE HYDROCHLORIDE 50 MG: 50 INJECTION, SOLUTION INTRAMUSCULAR; INTRAVENOUS at 13:10

## 2021-10-06 RX ADMIN — METHYLPREDNISOLONE SODIUM SUCCINATE 40 MG: 40 INJECTION, POWDER, FOR SOLUTION INTRAMUSCULAR; INTRAVENOUS at 13:08

## 2021-10-06 RX ADMIN — METHYLPREDNISOLONE SODIUM SUCCINATE 40 MG: 40 INJECTION, POWDER, FOR SOLUTION INTRAMUSCULAR; INTRAVENOUS at 18:33

## 2021-10-06 RX ADMIN — HYDROMORPHONE HYDROCHLORIDE 1 MG: 1 INJECTION, SOLUTION INTRAMUSCULAR; INTRAVENOUS; SUBCUTANEOUS at 13:12

## 2021-10-06 RX ADMIN — IOPAMIDOL 100 ML: 612 INJECTION, SOLUTION INTRAVENOUS at 14:07

## 2021-10-06 NOTE — ED PROVIDER NOTES
Subjective   Mr. Darby is a 61-year-old gentleman who on 7 September had transurethral resection of his prostate done by Dr. Alonzo.  Initially had some problems with urinary incontinence but that cleared up and he been doing well until yesterday.  He developed some constipation and while straining to defecate he developed severe left lower quadrant abdominal pain that he says radiates into his scrotum and into his thigh.  He presents today because of unremitting pain in that same area.  He denies fever or other systemic symptoms.  Is still able to urinate.          Review of Systems   Gastrointestinal: Positive for abdominal pain.   Genitourinary: Positive for scrotal swelling.   All other systems reviewed and are negative.      Past Medical History:   Diagnosis Date   • Coronary artery disease    • Diverticulosis    • Hx of heart artery stent     15 years ago x2 stents    • Hypertension    • Kidney stones        Allergies   Allergen Reactions   • Contrast Dye Hives   • Levaquin [Levofloxacin] Nausea Only, Swelling, Dizziness and Angioedema       Past Surgical History:   Procedure Laterality Date   • APPENDECTOMY     • CARDIAC CATHETERIZATION      15 years ago 2 stents per Dr. Humphrey    • CYSTOSCOPY TRANSURETHRAL RESECTION OF PROSTATE N/A 9/7/2021    Procedure: CYSTOSCOPY TRANSURETHRAL RESECTION OF PROSTATE;  Surgeon: Ervin Alonzo MD;  Location: [facility] OR;  Service: Urology;  Laterality: N/A;   • KNEE ARTHROPLASTY Right    • PERIPHERAL ARTERIAL STENT GRAFT     • TOTAL HIP ARTHROPLASTY Right        Family History   Problem Relation Age of Onset   • Diabetes Mother    • Heart disease Mother    • Heart disease Father        Social History     Socioeconomic History   • Marital status:      Spouse name: Not on file   • Number of children: Not on file   • Years of education: Not on file   • Highest education level: Not on file   Tobacco Use   • Smoking status: Current Every Day Smoker     Packs/day: 1.00      Years: 10.00     Pack years: 10.00     Types: Cigarettes   • Smokeless tobacco: Never Used   Vaping Use   • Vaping Use: Never used   Substance and Sexual Activity   • Alcohol use: Never   • Drug use: No   • Sexual activity: Defer           Objective   Physical Exam  Vitals and nursing note reviewed.   Constitutional:       General: He is in acute distress.      Appearance: He is well-developed.   HENT:      Head: Normocephalic and atraumatic.      Right Ear: External ear normal.      Left Ear: External ear normal.      Nose: Nose normal.   Eyes:      Conjunctiva/sclera: Conjunctivae normal.   Cardiovascular:      Rate and Rhythm: Normal rate and regular rhythm.      Heart sounds: Normal heart sounds.   Pulmonary:      Effort: Pulmonary effort is normal.      Breath sounds: Normal breath sounds.   Abdominal:      General: Bowel sounds are normal.      Palpations: Abdomen is soft.      Tenderness: There is abdominal tenderness in the suprapubic area and left lower quadrant. There is guarding.   Genitourinary:     Comments: The patient's scrotum is exquisitely tender especially on the left and seems firm to the touch.  Musculoskeletal:         General: Normal range of motion.      Cervical back: Normal range of motion and neck supple.   Skin:     General: Skin is warm and dry.      Capillary Refill: Capillary refill takes less than 2 seconds.   Neurological:      Mental Status: He is alert and oriented to person, place, and time.   Psychiatric:         Behavior: Behavior normal.         Thought Content: Thought content normal.         Judgment: Judgment normal.         Procedures           ED Course                                           MDM    Final diagnoses:   Orchitis       ED Disposition  ED Disposition     ED Disposition Condition Comment    Discharge Stable           Ervin Alonzo MD  [address] [address]  [phone]    Schedule an appointment as soon as possible for a visit             Medication List      New Prescriptions    cefdinir 300 MG capsule  Commonly known as: OMNICEF  Take 1 capsule by mouth 2 (Two) Times a Day.     ibuprofen 600 MG tablet  Commonly known as: ADVIL,MOTRIN  Take 1 tablet by mouth Every 6 (Six) Hours As Needed for Moderate Pain .           Where to Get Your Medications      These medications were sent to [facility]/pharmacy #[id number] - [place], [place] - [address] [place] [address] - [phone] [facility] [phone] [address] [address]    Phone: [phone]   · cefdinir 300 MG capsule  · ibuprofen 600 MG tablet       The patient's work-up revealed orchitis as the etiology of his discomfort.  Originally given a dose of Unasyn because I was concerned that this was an intra-abdominal process but added a gram of ceftriaxone to address the orchitis directly.  I discussed the case with Dr. Alonzo and he is comfortable with a sending the patient home with cefdinir (the patient is allergic to levofloxacin), NSAIDs and application of ice and scrotal elevation.  He is happy to see the patient in follow-up and said that his staff will call the patient in the morning to arrange that.     Bi Daly MD  10/06/21 9161

## 2021-10-06 NOTE — PROGRESS NOTES
Subjective     Chief Complaint   Patient presents with   • Abdominal Pain     Left side. Straining to go to the bathroom and caused pain. Testicle swollen        History of present illness.    Patient presents to office with complaint of left lower quadrant abdominal pain and left testicle pain.  He was straining last night to have bowel movement and experienced immediate pain and swelling of his left lower abdomen groin and testicle.  Over the night he has had increased pain and it has become unbearable.  His wife did bring him to the office.  The patient is in marked discomfort.  Patient's PMR from outside medical facility reviewed and noted.    Review of Systems     Otherwise complete ROS reviewed and negative except as mentioned in the HPI.    Past Medical History:   Past Medical History:   Diagnosis Date   • Coronary artery disease    • Diverticulosis    • Hx of heart artery stent     15 years ago x2 stents    • Hypertension    • Kidney stones      Past Surgical History:  Past Surgical History:   Procedure Laterality Date   • APPENDECTOMY     • CARDIAC CATHETERIZATION      15 years ago 2 stents per Dr. Humphrey    • CYSTOSCOPY TRANSURETHRAL RESECTION OF PROSTATE N/A 9/7/2021    Procedure: CYSTOSCOPY TRANSURETHRAL RESECTION OF PROSTATE;  Surgeon: Ervin Alonzo MD;  Location: [facility];  Service: Urology;  Laterality: N/A;   • KNEE ARTHROPLASTY Right    • PERIPHERAL ARTERIAL STENT GRAFT     • TOTAL HIP ARTHROPLASTY Right      Social History:  reports that he has been smoking cigarettes. He has a 10.00 pack-year smoking history. He has never used smokeless tobacco. He reports that he does not drink alcohol and does not use drugs.    Family History: family history includes Diabetes in his mother; Heart disease in his father and mother.       Allergies:  Allergies   Allergen Reactions   • Contrast Dye Hives   • Levaquin [Levofloxacin] Nausea Only, Swelling, Dizziness and Angioedema     Medications:  Prior  to Admission medications    Medication Sig Start Date End Date Taking? Authorizing Provider   albuterol sulfate  (90 Base) MCG/ACT inhaler Inhale 2 puffs Every 4 (Four) Hours As Needed for Wheezing. 8/12/21  Yes Sravanthi Oliveros DO   docusate sodium (Colace) 100 MG capsule Take 1 capsule by mouth 2 (Two) Times a Day. 9/8/21  Yes Ervin Alonzo MD   fluconazole (Diflucan) 200 MG tablet Take 1 tablet by mouth Daily. 6/16/21  Yes Sravanthi Oliveros DO   furosemide (LASIX) 20 MG tablet TAKE 1 TABLET BY MOUTH EVERY DAY 9/28/21  Yes Sravanthi Oliveros DO   gabapentin (NEURONTIN) 300 MG capsule Take 2 capsules by mouth 4 (Four) Times a Day for 240 days. 4/28/21 12/24/21 Yes Sravanthi Oliveros DO   glucose blood test strip Use as instructed 8/25/21  Yes Sravanthi Oliveros DO   glucose monitor monitoring kit 1 each 4 (Four) Times a Day Before Meals & at Bedtime. 8/25/21  Yes Sravanthi Oliveros DO   HYDROcodone-acetaminophen (Norco) 5-325 MG per tablet Take 1 tablet by mouth Every 6 (Six) Hours As Needed for Moderate Pain . 8/25/21  Yes Sravanthi Oliveros DO   HYDROcodone-acetaminophen (NORCO) 5-325 MG per tablet Take 1 tablet by mouth Every 6 (Six) Hours As Needed (postop pain). 9/8/21  Yes Ervin Alonzo MD   Lancets 30G misc 1 each 4 (Four) Times a Day Before Meals & at Bedtime. 8/25/21  Yes Sravanthi Oliveros DO   metoprolol tartrate (LOPRESSOR) 25 MG tablet TAKE 1 TABLET BY MOUTH TWICE A DAY  Patient taking differently: Take 25 mg by mouth 2 (Two) Times a Day. 5/11/21  Yes Lisy Melendrez APRN   nystatin (MYCOSTATIN) 566552 UNIT/ML suspension Swish and swallow 5 mL 4 (Four) Times a Day. 6/16/21  Yes Sravanthi Oliveros DO   ondansetron ODT (Zofran ODT) 4 MG disintegrating tablet Place 1 tablet on the tongue Every 8 (Eight) Hours As Needed for Nausea or Vomiting. 8/12/21  Yes Sravanthi Oliveros DO   ondansetron ODT (Zofran ODT) 4 MG disintegrating tablet Place 1 tablet on the  "tongue Every 6 (Six) Hours As Needed for Nausea. 9/8/21  Yes Ervin Alonzo MD   oxybutynin (DITROPAN) 5 MG tablet Take 1 tablet by mouth 3 (Three) Times a Day. 9/2/21  Yes Sravanthi Oliveros DO   oxybutynin (DITROPAN) 5 MG tablet Take 1 tablet by mouth Every 8 (Eight) Hours As Needed (bladder spasms). 9/8/21  Yes Ervin Alonzo MD   phenazopyridine (PYRIDIUM) 100 MG tablet Take 1 tablet by mouth 3 (Three) Times a Day As Needed (urinary burning). 9/8/21  Yes Ervin Alonzo MD       Objective     Vital Signs: /64   Pulse 100   Temp 98.6 °F (37 °C) (Skin)   Resp 20   Ht 177.8 cm (70\")   Wt 96.2 kg (212 lb 1.6 oz)   SpO2 98%   BMI 30.43 kg/m²   Physical Exam  Constitutional:       General: He is in acute distress.      Appearance: He is ill-appearing, toxic-appearing and diaphoretic.      Comments: Patient is in no acute distress.  He is tachypneic.  He is groaning.  He is diaphoretic.   HENT:      Head: Normocephalic and atraumatic.      Right Ear: External ear normal.      Left Ear: External ear normal.      Nose: Nose normal.   Eyes:      Extraocular Movements: Extraocular movements intact.      Pupils: Pupils are equal, round, and reactive to light.   Cardiovascular:      Rate and Rhythm: Regular rhythm. Tachycardia present.      Pulses: Normal pulses.   Pulmonary:      Breath sounds: Normal breath sounds.      Comments: Tachypneic  Abdominal:      General: Bowel sounds are normal.      Tenderness: There is abdominal tenderness ( Marked left lower quadrant tenderness palpation.  Flinches when left groin is palpated.).   Genitourinary:     Comments: Left scrotal swelling.  Musculoskeletal:         General: Normal range of motion.      Cervical back: Normal range of motion.   Skin:     General: Skin is warm.      Capillary Refill: Capillary refill takes less than 2 seconds.   Neurological:      Mental Status: He is alert and oriented to person, place, and time.   Psychiatric:         Mood " and Affect: Mood normal.         Patient's Body mass index is 30.43 kg/m².       Results Reviewed:  Glucose   Date Value Ref Range Status   09/01/2021 120 (H) 65 - 99 mg/dL Final     BUN   Date Value Ref Range Status   09/01/2021 14 8 - 23 mg/dL Final   06/24/2019 11 7 - 18 mg/dL Final     Creatinine   Date Value Ref Range Status   09/01/2021 1.05 0.76 - 1.27 mg/dL Final   06/24/2019 1.10 0.60 - 1.30 mg/dL Final     Sodium   Date Value Ref Range Status   09/01/2021 138 136 - 145 mmol/L Final   06/24/2019 138 135 - 145 mmol/L Final     Potassium   Date Value Ref Range Status   09/01/2021 4.2 3.5 - 5.2 mmol/L Final   06/24/2019 4.6 3.5 - 5.0 mmol/L Final     Chloride   Date Value Ref Range Status   09/01/2021 102 98 - 107 mmol/L Final   06/24/2019 101 98 - 107 mmol/L Final     CO2   Date Value Ref Range Status   09/01/2021 26.0 22.0 - 29.0 mmol/L Final     Total CO2   Date Value Ref Range Status   06/24/2019 26 21 - 32 mmol/L Final     Calcium   Date Value Ref Range Status   09/01/2021 8.6 8.6 - 10.5 mg/dL Final   06/24/2019 8.9 8.5 - 10.1 mg/dL Final     Comment:       Calcium measurements are adversely affected by the use of Omniscan during MRI. Analysis of calcium is not recommended for 12 to 24 hours after the use of the contrast agent.      ALT (SGPT)   Date Value Ref Range Status   06/02/2021 17 0 - 44 IU/L Final   06/07/2020 20 1 - 41 U/L Final   06/24/2019 33 16 - 62 U/L Final     AST (SGOT)   Date Value Ref Range Status   06/02/2021 17 0 - 40 IU/L Final   06/07/2020 18 1 - 40 U/L Final     Comment:     Specimen hemolyzed.  Results may be affected.   06/24/2019 18 7 - 34 U/L Final     WBC   Date Value Ref Range Status   09/01/2021 9.26 3.40 - 10.80 10*3/mm3 Final   06/02/2021 11.3 (H) 3.4 - 10.8 x10E3/uL Final   06/24/2019 8.7 5.0 - 10.0 K/uL Final     Hematocrit   Date Value Ref Range Status   09/01/2021 40.0 37.5 - 51.0 % Final   06/24/2019 47.6 40.0 - 54.0 % Final     Platelets   Date Value Ref Range Status    09/01/2021 338 140 - 450 10*3/mm3 Final   06/24/2019 314 150 - 500 K/uL Final     Total Cholesterol   Date Value Ref Range Status   09/30/2018 190 130 - 200 mg/dL Final     Triglycerides   Date Value Ref Range Status   09/30/2018 244 (H) 0 - 149 mg/dL Final     HDL Cholesterol   Date Value Ref Range Status   09/30/2018 33 (L) >=40 mg/dL Final     LDL Cholesterol    Date Value Ref Range Status   09/30/2018 130 (H) 0 - 99 mg/dL Final     LDL/HDL Ratio   Date Value Ref Range Status   09/30/2018 3.28  Final     Hemoglobin A1C   Date Value Ref Range Status   08/25/2021 6.4 % Final         Assessment / Plan     Assessment/Plan:  1. Left lower quadrant abdominal pain      2. Left inguinal pain    Concern is for an incarcerated left inguinal hernia.  Patient is acutely ill.  Discussed with patient and wife.  We will transition him to the [facility] emergency room for further evaluation and treatment of this acute problem.  [facility] has been called.    The [facility] ER has been notified of patient.  Discussed with Aracelis SOLIS.    Keep scheduled follow up visits        I have discussed the patient results/orders and and plan/recommendation with them at today's visit.      Sravanthi Oliveros DO   10/06/2021

## 2021-10-08 LAB — BACTERIA SPEC AEROBE CULT: ABNORMAL

## 2021-10-11 LAB
BACTERIA SPEC AEROBE CULT: NORMAL
BACTERIA SPEC AEROBE CULT: NORMAL

## 2021-10-11 NOTE — PROGRESS NOTES
Subjective    Mr. Darby is 61 y.o. male    Chief Complaint: Postoperative visit    History of Present Illness  61-year-old male follow-up status post TURP 9/7/2021 for catheter dependent urinary retention.  He was seen in the ER on 10/6/2021 for left scrotal swelling was diagnosed with left epididymal orchitis and is completing a course of cefdinir.  He states his swelling has improved but he continues to have left scrotal contents pain radiating into his inguinal canal.  Bilateral scrotal ultrasound on 10/6/2021 reviewed by me with the patient today shows hyperemic left testicle with enlarged left epididymis, no testicular masses.  Urine culture from ER grew less than 10,000 Pseudomonas.  Urine was sent from today's visit and came back no growth.  He also had a CT scan in the ER showing too small to characterize bilateral renal lesions, no masses or hernia.  He has also noted some decreased force of stream.    I independently visualized and reviewed the patient's prior imaging studies today in clinic and discussed the imaging findings with the patient.      The following portions of the patient's history were reviewed and updated as appropriate: allergies, current medications, past family history, past medical history, past social history, past surgical history and problem list.    Review of Systems      Current Outpatient Medications:   •  albuterol sulfate  (90 Base) MCG/ACT inhaler, Inhale 2 puffs Every 4 (Four) Hours As Needed for Wheezing., Disp: 18 g, Rfl: 6  •  cefdinir (OMNICEF) 300 MG capsule, Take 1 capsule by mouth 2 (Two) Times a Day., Disp: 28 capsule, Rfl: 0  •  docusate sodium (Colace) 100 MG capsule, Take 1 capsule by mouth 2 (Two) Times a Day., Disp: 60 capsule, Rfl: 0  •  fluconazole (Diflucan) 200 MG tablet, Take 1 tablet by mouth Daily., Disp: 6 tablet, Rfl: 0  •  furosemide (LASIX) 20 MG tablet, TAKE 1 TABLET BY MOUTH EVERY DAY, Disp: 30 tablet, Rfl: 0  •  gabapentin (NEURONTIN) 300 MG  capsule, Take 2 capsules by mouth 4 (Four) Times a Day for 240 days., Disp: 240 capsule, Rfl: 7  •  glucose blood test strip, Use as instructed, Disp: 150 each, Rfl: 11  •  glucose monitor monitoring kit, 1 each 4 (Four) Times a Day Before Meals & at Bedtime., Disp: 150 each, Rfl: 11  •  HYDROcodone-acetaminophen (Norco) 5-325 MG per tablet, Take 1 tablet by mouth Every 6 (Six) Hours As Needed for Moderate Pain ., Disp: 30 tablet, Rfl: 0  •  HYDROcodone-acetaminophen (NORCO) 5-325 MG per tablet, Take 1 tablet by mouth Every 6 (Six) Hours As Needed (postop pain)., Disp: 12 tablet, Rfl: 0  •  ibuprofen (ADVIL,MOTRIN) 600 MG tablet, Take 1 tablet by mouth Every 6 (Six) Hours As Needed for Moderate Pain ., Disp: 20 tablet, Rfl: 0  •  Lancets 30G misc, 1 each 4 (Four) Times a Day Before Meals & at Bedtime., Disp: 150 each, Rfl: 11  •  metoprolol tartrate (LOPRESSOR) 25 MG tablet, TAKE 1 TABLET BY MOUTH TWICE A DAY (Patient taking differently: Take 25 mg by mouth 2 (Two) Times a Day.), Disp: 180 tablet, Rfl: 1  •  nystatin (MYCOSTATIN) 990485 UNIT/ML suspension, Swish and swallow 5 mL 4 (Four) Times a Day., Disp: 473 mL, Rfl: 1  •  ondansetron ODT (Zofran ODT) 4 MG disintegrating tablet, Place 1 tablet on the tongue Every 8 (Eight) Hours As Needed for Nausea or Vomiting., Disp: 30 tablet, Rfl: 3  •  ondansetron ODT (Zofran ODT) 4 MG disintegrating tablet, Place 1 tablet on the tongue Every 6 (Six) Hours As Needed for Nausea., Disp: 6 tablet, Rfl: 1  •  oxybutynin (DITROPAN) 5 MG tablet, Take 1 tablet by mouth 3 (Three) Times a Day., Disp: 30 tablet, Rfl: 0  •  oxybutynin (DITROPAN) 5 MG tablet, Take 1 tablet by mouth Every 8 (Eight) Hours As Needed (bladder spasms)., Disp: 30 tablet, Rfl: 1  •  phenazopyridine (PYRIDIUM) 100 MG tablet, Take 1 tablet by mouth 3 (Three) Times a Day As Needed (urinary burning)., Disp: 20 tablet, Rfl: 1    Past Medical History:   Diagnosis Date   • Coronary artery disease    • Diverticulosis     • Hx of heart artery stent     15 years ago x2 stents    • Hypertension    • Kidney stones        Past Surgical History:   Procedure Laterality Date   • APPENDECTOMY     • CARDIAC CATHETERIZATION      15 years ago 2 stents per Dr. Humphrey    • CYSTOSCOPY TRANSURETHRAL RESECTION OF PROSTATE N/A 9/7/2021    Procedure: CYSTOSCOPY TRANSURETHRAL RESECTION OF PROSTATE;  Surgeon: Ervin Alonzo MD;  Location: [facility] OR;  Service: Urology;  Laterality: N/A;   • KNEE ARTHROPLASTY Right    • PERIPHERAL ARTERIAL STENT GRAFT     • TOTAL HIP ARTHROPLASTY Right        Social History     Socioeconomic History   • Marital status:    Tobacco Use   • Smoking status: Current Every Day Smoker     Packs/day: 1.00     Years: 10.00     Pack years: 10.00     Types: Cigarettes   • Smokeless tobacco: Never Used   Vaping Use   • Vaping Use: Never used   Substance and Sexual Activity   • Alcohol use: Never   • Drug use: No   • Sexual activity: Defer       Family History   Problem Relation Age of Onset   • Diabetes Mother    • Heart disease Mother    • Heart disease Father        Objective    There were no vitals taken for this visit.    Physical Exam  Penis normal.  Right testicle normal.  Left testicle without masses.  There is residual tenderness along his left epididymis and mild epididymal enlargement on the left.  No hernia palpable.      Results for orders placed or performed during the hospital encounter of 10/06/21   Blood Culture - Blood, Hand, Left    Specimen: Hand, Left; Blood   Result Value Ref Range    Blood Culture No growth at 5 days    Blood Culture - Blood, Hand, Right    Specimen: Hand, Right; Blood   Result Value Ref Range    Blood Culture No growth at 5 days    Urine Culture - Urine, Urine, Clean Catch    Specimen: Urine, Clean Catch   Result Value Ref Range    Urine Culture <10,000 CFU/mL Pseudomonas species (A)    Comprehensive Metabolic Panel    Specimen: Blood   Result Value Ref Range    Glucose 157 (H) 65 -  99 mg/dL    BUN 11 8 - 23 mg/dL    Creatinine 0.98 0.76 - 1.27 mg/dL    Sodium 132 (L) 136 - 145 mmol/L    Potassium 4.6 3.5 - 5.2 mmol/L    Chloride 96 (L) 98 - 107 mmol/L    CO2 25.0 22.0 - 29.0 mmol/L    Calcium 9.3 8.6 - 10.5 mg/dL    Total Protein 7.7 6.0 - 8.5 g/dL    Albumin 4.20 3.50 - 5.20 g/dL    ALT (SGPT) 14 1 - 41 U/L    AST (SGOT) 16 1 - 40 U/L    Alkaline Phosphatase 146 (H) 39 - 117 U/L    Total Bilirubin 0.8 0.0 - 1.2 mg/dL    eGFR Non African Amer 77 >60 mL/min/1.73    Globulin 3.5 gm/dL    A/G Ratio 1.2 g/dL    BUN/Creatinine Ratio 11.2 7.0 - 25.0    Anion Gap 11.0 5.0 - 15.0 mmol/L   Protime-INR    Specimen: Blood   Result Value Ref Range    Protime 12.5 11.9 - 14.6 Seconds    INR 0.97 0.91 - 1.09   aPTT    Specimen: Blood   Result Value Ref Range    PTT 29.7 24.1 - 35.0 seconds   Lipase    Specimen: Blood   Result Value Ref Range    Lipase 21 13 - 60 U/L   Urinalysis With Culture If Indicated - Urine, Clean Catch    Specimen: Urine, Clean Catch   Result Value Ref Range    Color, UA Yellow Yellow, Straw    Appearance, UA Clear Clear    pH, UA >=9.0 (H) 5.0 - 8.0    Specific Gravity, UA 1.019 1.005 - 1.030    Glucose, UA Negative Negative    Ketones, UA Negative Negative    Bilirubin, UA Negative Negative    Blood, UA Moderate (2+) (A) Negative    Protein, UA 30 mg/dL (1+) (A) Negative    Leuk Esterase, UA Moderate (2+) (A) Negative    Nitrite, UA Negative Negative    Urobilinogen, UA 0.2 E.U./dL 0.2 - 1.0 E.U./dL   Lactic Acid, Plasma    Specimen: Blood   Result Value Ref Range    Lactate 1.7 0.5 - 2.0 mmol/L   Magnesium    Specimen: Blood   Result Value Ref Range    Magnesium 1.6 1.6 - 2.4 mg/dL   CBC Auto Differential    Specimen: Blood   Result Value Ref Range    WBC 22.61 (H) 3.40 - 10.80 10*3/mm3    RBC 4.89 4.14 - 5.80 10*6/mm3    Hemoglobin 14.2 13.0 - 17.7 g/dL    Hematocrit 42.2 37.5 - 51.0 %    MCV 86.3 79.0 - 97.0 fL    MCH 29.0 26.6 - 33.0 pg    MCHC 33.6 31.5 - 35.7 g/dL    RDW 13.7  12.3 - 15.4 %    RDW-SD 43.0 37.0 - 54.0 fl    MPV 9.8 6.0 - 12.0 fL    Platelets 338 140 - 450 10*3/mm3    Neutrophil % 89.9 (H) 42.7 - 76.0 %    Lymphocyte % 2.3 (L) 19.6 - 45.3 %    Monocyte % 6.1 5.0 - 12.0 %    Eosinophil % 0.0 (L) 0.3 - 6.2 %    Basophil % 0.3 0.0 - 1.5 %    Immature Grans % 1.4 (H) 0.0 - 0.5 %    Neutrophils, Absolute 20.34 (H) 1.70 - 7.00 10*3/mm3    Lymphocytes, Absolute 0.51 (L) 0.70 - 3.10 10*3/mm3    Monocytes, Absolute 1.39 (H) 0.10 - 0.90 10*3/mm3    Eosinophils, Absolute 0.00 0.00 - 0.40 10*3/mm3    Basophils, Absolute 0.06 0.00 - 0.20 10*3/mm3    Immature Grans, Absolute 0.31 (H) 0.00 - 0.05 10*3/mm3    nRBC 0.0 0.0 - 0.2 /100 WBC   Urinalysis, Microscopic Only - Urine, Clean Catch    Specimen: Urine, Clean Catch   Result Value Ref Range    RBC, UA 31-50 (A) None Seen /HPF    WBC, UA Too Numerous to Count (A) None Seen /HPF    Bacteria, UA None Seen None Seen /HPF    Squamous Epithelial Cells, UA 0-2 None Seen, 0-2 /HPF    Hyaline Casts, UA 0-2 None Seen /LPF    Methodology Automated Microscopy      Assessment and Plan    Diagnoses and all orders for this visit:    1. Postoperative visit (Primary)    2. Epididymitis  -     sulfamethoxazole-trimethoprim (BACTRIM DS,SEPTRA DS) 800-160 MG per tablet; Take 1 tablet by mouth 2 (Two) Times a Day for 21 days.  Dispense: 42 tablet; Refill: 0  -     naproxen (Naprosyn) 500 MG tablet; Take 1 tablet by mouth 2 (Two) Times a Day With Meals.  Dispense: 60 tablet; Refill: 0    3. Dysuria  -     Urine Culture - Urine, Urine, Random Void      I recommended he complete another 3 weeks of antibiotics.  We'll give a course of Bactrim.  I recommended scheduled NSAIDs twice daily along with regular scrotal support and scrotal elevation and ice packs.  He will follow-up with me next month for cystoscopy in office to rule out stricture or bladder neck contracture and for repeat examination.      This document has been signed by CHELITA Alonzo MD on  October 22, 2021 15:43 CDT

## 2021-10-18 ENCOUNTER — TELEPHONE (OUTPATIENT)
Dept: INTERNAL MEDICINE | Facility: CLINIC | Age: 61
End: 2021-10-18

## 2021-10-18 DIAGNOSIS — M79.662 PAIN IN BOTH LOWER LEGS: ICD-10-CM

## 2021-10-18 DIAGNOSIS — N40.1 BENIGN PROSTATIC HYPERPLASIA (BPH) WITH STRAINING ON URINATION: ICD-10-CM

## 2021-10-18 DIAGNOSIS — M79.661 PAIN IN BOTH LOWER LEGS: ICD-10-CM

## 2021-10-18 DIAGNOSIS — R39.16 BENIGN PROSTATIC HYPERPLASIA (BPH) WITH STRAINING ON URINATION: ICD-10-CM

## 2021-10-18 NOTE — TELEPHONE ENCOUNTER
Patient does not need refills from Dr. Oliveros at this time. He has 1 more refill on each one of them at [facility] in [place].    I have called the patient and he voiced understanding.

## 2021-10-18 NOTE — TELEPHONE ENCOUNTER
Incoming Refill Request      Medication requested (name and dose): furosemide (LASIX) 20 MG tablet   gabapentin (NEURONTIN) 300 MG capsule   metoprolol tartrate (LOPRESSOR) 25 MG tablet     Pharmacy where request should be sent: [facility]    Additional details provided by patient: Pt made a follow up appt with Dr. Oliveros next week but is needing med refills.    Best call back number: [phone]    Does the patient have less than a 3 day supply:  [x] Yes  [] No    Alana Hernandez Rep  10/18/21, 14:26 CDT

## 2021-10-20 ENCOUNTER — OFFICE VISIT (OUTPATIENT)
Dept: UROLOGY | Facility: CLINIC | Age: 61
End: 2021-10-20

## 2021-10-20 DIAGNOSIS — Z48.89 POSTOPERATIVE VISIT: Primary | ICD-10-CM

## 2021-10-20 DIAGNOSIS — N45.1 EPIDIDYMITIS: ICD-10-CM

## 2021-10-20 DIAGNOSIS — R30.0 DYSURIA: ICD-10-CM

## 2021-10-20 PROCEDURE — 87086 URINE CULTURE/COLONY COUNT: CPT | Performed by: UROLOGY

## 2021-10-20 PROCEDURE — 99024 POSTOP FOLLOW-UP VISIT: CPT | Performed by: UROLOGY

## 2021-10-20 RX ORDER — SULFAMETHOXAZOLE AND TRIMETHOPRIM 800; 160 MG/1; MG/1
1 TABLET ORAL 2 TIMES DAILY
Qty: 42 TABLET | Refills: 0 | Status: SHIPPED | OUTPATIENT
Start: 2021-10-20 | End: 2021-11-10

## 2021-10-20 RX ORDER — NAPROXEN 500 MG/1
500 TABLET ORAL 2 TIMES DAILY WITH MEALS
Qty: 60 TABLET | Refills: 0 | Status: SHIPPED | OUTPATIENT
Start: 2021-10-20 | End: 2021-11-02

## 2021-10-21 LAB — BACTERIA SPEC AEROBE CULT: NO GROWTH

## 2021-10-27 RX ORDER — ONDANSETRON 4 MG/1
4 TABLET, ORALLY DISINTEGRATING ORAL EVERY 6 HOURS PRN
Qty: 6 TABLET | Refills: 1 | Status: SHIPPED | OUTPATIENT
Start: 2021-10-27 | End: 2022-02-14

## 2021-10-27 RX ORDER — GABAPENTIN 300 MG/1
CAPSULE ORAL
Qty: 240 CAPSULE | Refills: 1 | Status: SHIPPED | OUTPATIENT
Start: 2021-10-27 | End: 2021-12-28

## 2021-10-27 RX ORDER — FUROSEMIDE 20 MG/1
TABLET ORAL
Qty: 30 TABLET | Refills: 0 | Status: SHIPPED | OUTPATIENT
Start: 2021-10-27

## 2021-10-28 ENCOUNTER — OFFICE VISIT (OUTPATIENT)
Dept: INTERNAL MEDICINE | Facility: CLINIC | Age: 61
End: 2021-10-28

## 2021-10-28 VITALS
WEIGHT: 199.3 LBS | HEART RATE: 66 BPM | DIASTOLIC BLOOD PRESSURE: 70 MMHG | RESPIRATION RATE: 17 BRPM | TEMPERATURE: 97.6 F | OXYGEN SATURATION: 99 % | SYSTOLIC BLOOD PRESSURE: 122 MMHG | BODY MASS INDEX: 28.53 KG/M2 | HEIGHT: 70 IN

## 2021-10-28 DIAGNOSIS — K82.9 GALL BLADDER DISEASE: ICD-10-CM

## 2021-10-28 DIAGNOSIS — E11.9 TYPE 2 DIABETES MELLITUS WITHOUT COMPLICATION, WITHOUT LONG-TERM CURRENT USE OF INSULIN (HCC): ICD-10-CM

## 2021-10-28 DIAGNOSIS — R10.84 GENERALIZED ABDOMINAL PAIN: Primary | ICD-10-CM

## 2021-10-28 PROCEDURE — 99214 OFFICE O/P EST MOD 30 MIN: CPT | Performed by: FAMILY MEDICINE

## 2021-10-28 NOTE — PROGRESS NOTES
Subjective     Chief Complaint   Patient presents with   • Abdominal Pain     Follow up.    • Fatigue       History of Present Illness  Patient presents to office for evaluation of abdominal pain.  He continues to have significant pain   He has cut out the greasy fried foods.   He does have increased pain with eating some times.   He has had pain with increasing activity    He notes that he starts sweating and gets light headed. Then becomes sick to stomach and feels weak and fatigued.   Usually will start feeling a little better after about an hour or so.     Abdomen still very tender to touch.    Patient's PMR from outside medical facility reviewed and noted.    Review of Systems     Otherwise complete ROS reviewed and negative except as mentioned in the HPI.    Past Medical History:   Past Medical History:   Diagnosis Date   • Coronary artery disease    • Diverticulosis    • Hx of heart artery stent     15 years ago x2 stents    • Hypertension    • Kidney stones      Past Surgical History:  Past Surgical History:   Procedure Laterality Date   • APPENDECTOMY     • CARDIAC CATHETERIZATION      15 years ago 2 stents per Dr. Humphrey    • CYSTOSCOPY TRANSURETHRAL RESECTION OF PROSTATE N/A 9/7/2021    Procedure: CYSTOSCOPY TRANSURETHRAL RESECTION OF PROSTATE;  Surgeon: Ervin Alonzo MD;  Location: [facility];  Service: Urology;  Laterality: N/A;   • KNEE ARTHROPLASTY Right    • PERIPHERAL ARTERIAL STENT GRAFT     • TOTAL HIP ARTHROPLASTY Right      Social History:  reports that he has been smoking cigarettes. He has a 10.00 pack-year smoking history. He has never used smokeless tobacco. He reports that he does not drink alcohol and does not use drugs.    Family History: family history includes Diabetes in his mother; Heart disease in his father and mother.       Allergies:  Allergies   Allergen Reactions   • Contrast Dye Hives   • Levaquin [Levofloxacin] Nausea Only, Swelling, Dizziness and Angioedema      Medications:  Prior to Admission medications    Medication Sig Start Date End Date Taking? Authorizing Provider   albuterol sulfate  (90 Base) MCG/ACT inhaler Inhale 2 puffs Every 4 (Four) Hours As Needed for Wheezing. 8/12/21  Yes Sravanthi Oliveros DO   furosemide (LASIX) 20 MG tablet TAKE 1 TABLET BY MOUTH EVERY DAY 10/27/21  Yes Sravanthi Oliveros DO   gabapentin (NEURONTIN) 300 MG capsule TAKE 2 CAPSULES BY MOUTH 4 (FOUR) TIMES A DAY 10/27/21  Yes Sravanthi Oliveros DO   ibuprofen (ADVIL,MOTRIN) 600 MG tablet Take 1 tablet by mouth Every 6 (Six) Hours As Needed for Moderate Pain . 10/6/21  Yes Bi Daly MD   Lancets 30G misc 1 each 4 (Four) Times a Day Before Meals & at Bedtime. 8/25/21  Yes Sravanthi Oliveros DO   metoprolol tartrate (LOPRESSOR) 25 MG tablet TAKE 1 TABLET BY MOUTH TWICE A DAY  Patient taking differently: Take 25 mg by mouth 2 (Two) Times a Day. 5/11/21  Yes Lisy Melendrez APRN   naproxen (Naprosyn) 500 MG tablet Take 1 tablet by mouth 2 (Two) Times a Day With Meals. 10/20/21  Yes Ervin Alonzo MD   nystatin (MYCOSTATIN) 798990 UNIT/ML suspension Swish and swallow 5 mL 4 (Four) Times a Day. 6/16/21  Yes Sravanthi Oliveros DO   ondansetron ODT (Zofran ODT) 4 MG disintegrating tablet Place 1 tablet on the tongue Every 6 (Six) Hours As Needed for Nausea. 10/27/21  Yes Sravanthi Oliveros DO   sulfamethoxazole-trimethoprim (BACTRIM DS,SEPTRA DS) 800-160 MG per tablet Take 1 tablet by mouth 2 (Two) Times a Day for 21 days. 10/20/21 11/10/21 Yes Ervin Alonzo MD   cefdinir (OMNICEF) 300 MG capsule Take 1 capsule by mouth 2 (Two) Times a Day. 10/6/21   Bi Daly MD   docusate sodium (Colace) 100 MG capsule Take 1 capsule by mouth 2 (Two) Times a Day. 9/8/21   Ervin Alonzo MD   fluconazole (Diflucan) 200 MG tablet Take 1 tablet by mouth Daily. 6/16/21   Sravanthi Oliveros,    glucose blood test strip Use as instructed 8/25/21   " Sravanthi Oliveros DO   glucose monitor monitoring kit 1 each 4 (Four) Times a Day Before Meals & at Bedtime. 8/25/21   Sravanthi Oliveros DO   HYDROcodone-acetaminophen (Norco) 5-325 MG per tablet Take 1 tablet by mouth Every 6 (Six) Hours As Needed for Moderate Pain . 8/25/21   Sravanthi Oliveros DO   HYDROcodone-acetaminophen (NORCO) 5-325 MG per tablet Take 1 tablet by mouth Every 6 (Six) Hours As Needed (postop pain). 9/8/21   Ervin Alonzo MD   oxybutynin (DITROPAN) 5 MG tablet Take 1 tablet by mouth 3 (Three) Times a Day. 9/2/21   Sravanthi Oliveros DO   oxybutynin (DITROPAN) 5 MG tablet Take 1 tablet by mouth Every 8 (Eight) Hours As Needed (bladder spasms). 9/8/21   Ervin Alonzo MD   phenazopyridine (PYRIDIUM) 100 MG tablet Take 1 tablet by mouth 3 (Three) Times a Day As Needed (urinary burning). 9/8/21   Ervin Alonzo MD       Objective     Vital Signs: /70 (BP Location: Left arm, Patient Position: Sitting, Cuff Size: Adult)   Pulse 66   Temp 97.6 °F (36.4 °C) (Skin)   Resp 17   Ht 177.8 cm (70\")   Wt 90.4 kg (199 lb 4.8 oz)   SpO2 99%   BMI 28.60 kg/m²   Physical Exam  Vitals and nursing note reviewed.   Constitutional:       General: He is in acute distress.      Appearance: Normal appearance. He is ill-appearing.   HENT:      Head: Normocephalic and atraumatic.      Right Ear: External ear normal.      Left Ear: External ear normal.      Nose: Nose normal.      Mouth/Throat:      Mouth: Mucous membranes are moist.   Eyes:      Extraocular Movements: Extraocular movements intact.      Conjunctiva/sclera: Conjunctivae normal.      Pupils: Pupils are equal, round, and reactive to light.   Cardiovascular:      Rate and Rhythm: Normal rate and regular rhythm.      Pulses: Normal pulses.      Heart sounds: Normal heart sounds.   Pulmonary:      Effort: Pulmonary effort is normal.      Breath sounds: Normal breath sounds.   Abdominal:      General: Bowel sounds are normal. "      Tenderness: There is abdominal tenderness (generalized).   Musculoskeletal:         General: Normal range of motion.      Cervical back: Normal range of motion and neck supple.   Skin:     General: Skin is warm and dry.      Capillary Refill: Capillary refill takes less than 2 seconds.   Neurological:      General: No focal deficit present.      Mental Status: He is alert and oriented to person, place, and time.      Cranial Nerves: No cranial nerve deficit.   Psychiatric:         Mood and Affect: Mood normal.         Behavior: Behavior normal.         Patient's Body mass index is 28.6 kg/m².       Results Reviewed:  Glucose   Date Value Ref Range Status   10/06/2021 157 (H) 65 - 99 mg/dL Final     BUN   Date Value Ref Range Status   10/06/2021 11 8 - 23 mg/dL Final   06/24/2019 11 7 - 18 mg/dL Final     Creatinine   Date Value Ref Range Status   10/06/2021 0.98 0.76 - 1.27 mg/dL Final   06/24/2019 1.10 0.60 - 1.30 mg/dL Final     Sodium   Date Value Ref Range Status   10/06/2021 132 (L) 136 - 145 mmol/L Final   06/24/2019 138 135 - 145 mmol/L Final     Potassium   Date Value Ref Range Status   10/06/2021 4.6 3.5 - 5.2 mmol/L Final   06/24/2019 4.6 3.5 - 5.0 mmol/L Final     Chloride   Date Value Ref Range Status   10/06/2021 96 (L) 98 - 107 mmol/L Final   06/24/2019 101 98 - 107 mmol/L Final     CO2   Date Value Ref Range Status   10/06/2021 25.0 22.0 - 29.0 mmol/L Final     Total CO2   Date Value Ref Range Status   06/24/2019 26 21 - 32 mmol/L Final     Calcium   Date Value Ref Range Status   10/06/2021 9.3 8.6 - 10.5 mg/dL Final   06/24/2019 8.9 8.5 - 10.1 mg/dL Final     Comment:       Calcium measurements are adversely affected by the use of Omniscan during MRI. Analysis of calcium is not recommended for 12 to 24 hours after the use of the contrast agent.      ALT (SGPT)   Date Value Ref Range Status   10/06/2021 14 1 - 41 U/L Final   06/24/2019 33 16 - 62 U/L Final     AST (SGOT)   Date Value Ref Range  Status   10/06/2021 16 1 - 40 U/L Final   06/24/2019 18 7 - 34 U/L Final     WBC   Date Value Ref Range Status   10/06/2021 22.61 (H) 3.40 - 10.80 10*3/mm3 Final   06/02/2021 11.3 (H) 3.4 - 10.8 x10E3/uL Final   06/24/2019 8.7 5.0 - 10.0 K/uL Final     Hematocrit   Date Value Ref Range Status   10/06/2021 42.2 37.5 - 51.0 % Final   06/24/2019 47.6 40.0 - 54.0 % Final     Platelets   Date Value Ref Range Status   10/06/2021 338 140 - 450 10*3/mm3 Final   06/24/2019 314 150 - 500 K/uL Final     Total Cholesterol   Date Value Ref Range Status   09/30/2018 190 130 - 200 mg/dL Final     Triglycerides   Date Value Ref Range Status   09/30/2018 244 (H) 0 - 149 mg/dL Final     HDL Cholesterol   Date Value Ref Range Status   09/30/2018 33 (L) >=40 mg/dL Final     LDL Cholesterol    Date Value Ref Range Status   09/30/2018 130 (H) 0 - 99 mg/dL Final     LDL/HDL Ratio   Date Value Ref Range Status   09/30/2018 3.28  Final     Hemoglobin A1C   Date Value Ref Range Status   08/25/2021 6.4 % Final         Assessment / Plan     Assessment/Plan:  1. Generalized abdominal pain    - Comprehensive metabolic panel  - CBC w AUTO Differential  - Ambulatory Referral to General Surgery  Dr Rebolledo    2. Type 2 diabetes mellitus without complication, without long-term current use of insulin (HCC)    - Hemoglobin A1c    3. Gall bladder disease  Sludge on imaging study        Return in about 4 weeks (around 11/25/2021). unless patient needs to be seen sooner or acute issues arise.        I have discussed the patient results/orders and and plan/recommendation with them at today's visit.      Sravanthi Oliveros,    10/28/2021

## 2021-10-30 LAB
ALBUMIN SERPL-MCNC: 4.4 G/DL (ref 3.8–4.8)
ALBUMIN/GLOB SERPL: 1.6 {RATIO} (ref 1.2–2.2)
ALP SERPL-CCNC: 129 IU/L (ref 44–121)
ALT SERPL-CCNC: 18 IU/L (ref 0–44)
AST SERPL-CCNC: 15 IU/L (ref 0–40)
BASOPHILS # BLD AUTO: 0.1 X10E3/UL (ref 0–0.2)
BASOPHILS NFR BLD AUTO: 1 %
BILIRUB SERPL-MCNC: <0.2 MG/DL (ref 0–1.2)
BUN SERPL-MCNC: 22 MG/DL (ref 8–27)
BUN/CREAT SERPL: 12 (ref 10–24)
CALCIUM SERPL-MCNC: 9.4 MG/DL (ref 8.6–10.2)
CHLORIDE SERPL-SCNC: 101 MMOL/L (ref 96–106)
CO2 SERPL-SCNC: 19 MMOL/L (ref 20–29)
CREAT SERPL-MCNC: 1.79 MG/DL (ref 0.76–1.27)
EOSINOPHIL # BLD AUTO: 0.4 X10E3/UL (ref 0–0.4)
EOSINOPHIL NFR BLD AUTO: 5 %
ERYTHROCYTE [DISTWIDTH] IN BLOOD BY AUTOMATED COUNT: 14 % (ref 11.6–15.4)
GLOBULIN SER CALC-MCNC: 2.7 G/DL (ref 1.5–4.5)
GLUCOSE SERPL-MCNC: 90 MG/DL (ref 65–99)
HBA1C MFR BLD: 6.6 % (ref 4.8–5.6)
HCT VFR BLD AUTO: 41.1 % (ref 37.5–51)
HGB BLD-MCNC: 13.4 G/DL (ref 13–17.7)
IMM GRANULOCYTES # BLD AUTO: 0.1 X10E3/UL (ref 0–0.1)
IMM GRANULOCYTES NFR BLD AUTO: 1 %
LYMPHOCYTES # BLD AUTO: 2.9 X10E3/UL (ref 0.7–3.1)
LYMPHOCYTES NFR BLD AUTO: 33 %
MCH RBC QN AUTO: 29.5 PG (ref 26.6–33)
MCHC RBC AUTO-ENTMCNC: 32.6 G/DL (ref 31.5–35.7)
MCV RBC AUTO: 90 FL (ref 79–97)
MONOCYTES # BLD AUTO: 0.8 X10E3/UL (ref 0.1–0.9)
MONOCYTES NFR BLD AUTO: 9 %
NEUTROPHILS # BLD AUTO: 4.6 X10E3/UL (ref 1.4–7)
NEUTROPHILS NFR BLD AUTO: 51 %
PLATELET # BLD AUTO: 400 X10E3/UL (ref 150–450)
POTASSIUM SERPL-SCNC: 5.3 MMOL/L (ref 3.5–5.2)
PROT SERPL-MCNC: 7.1 G/DL (ref 6–8.5)
RBC # BLD AUTO: 4.55 X10E6/UL (ref 4.14–5.8)
SODIUM SERPL-SCNC: 134 MMOL/L (ref 134–144)
WBC # BLD AUTO: 8.8 X10E3/UL (ref 3.4–10.8)

## 2021-11-02 ENCOUNTER — TELEPHONE (OUTPATIENT)
Dept: INTERNAL MEDICINE | Facility: CLINIC | Age: 61
End: 2021-11-02

## 2021-11-02 DIAGNOSIS — N17.9 ACUTE RENAL FAILURE, UNSPECIFIED ACUTE RENAL FAILURE TYPE (HCC): Primary | ICD-10-CM

## 2021-11-02 NOTE — TELEPHONE ENCOUNTER
Attempted to call patient. No answer. Left vm for patient to return call to discuss lab results and recommendations per Dr. Oliveros.

## 2021-11-05 ENCOUNTER — TELEPHONE (OUTPATIENT)
Dept: INTERNAL MEDICINE | Facility: CLINIC | Age: 61
End: 2021-11-05

## 2021-11-05 NOTE — TELEPHONE ENCOUNTER
Caller: Nalini Darby    Relationship to patient:    Best call back number: [phone]    Patient is needing Freestyle Precison MARI test strips sent to [facility]. Patient was supposed to have surgery for his gallbladder, but he has not heard anything in regards to this. Please advise.       [facility]/pharmacy #[id number] - [place], [place] - [address] [phone] [facility] [phone] FX

## 2021-11-05 NOTE — TELEPHONE ENCOUNTER
Rx Refill Note  Requested Prescriptions     Pending Prescriptions Disp Refills   • glucose blood test strip 150 each 11     Sig: Use as instructed      Last office visit with prescribing clinician: 10/28/2021      Next office visit with prescribing clinician: Visit date not found     Patient is requesting Freestyle precision MARI test strips.      {TIP  Is Refill Pharmacy correct?:23}  Earlene Steinberg MA  11/05/21, 16:06 CDT

## 2021-11-09 NOTE — TELEPHONE ENCOUNTER
Attempted to call patient again regarding repeat lab needed. No answer. Left voicemail asking him to return call so that we can have him repeat lab to check potassium.

## 2021-11-12 DIAGNOSIS — N45.1 EPIDIDYMITIS: ICD-10-CM

## 2021-11-12 RX ORDER — NAPROXEN 500 MG/1
TABLET ORAL
Qty: 60 TABLET | Refills: 0 | Status: SHIPPED | OUTPATIENT
Start: 2021-11-12 | End: 2022-04-04

## 2021-11-12 RX ORDER — SULFAMETHOXAZOLE AND TRIMETHOPRIM 800; 160 MG/1; MG/1
TABLET ORAL
Qty: 42 TABLET | Refills: 0 | OUTPATIENT
Start: 2021-11-12

## 2021-11-12 NOTE — TELEPHONE ENCOUNTER
Rx Refill Note  Requested Prescriptions     Pending Prescriptions Disp Refills   • metoprolol tartrate (LOPRESSOR) 25 MG tablet [Pharmacy Med Name: METOPROLOL TARTRATE 25 MG TAB] 180 tablet 1     Sig: TAKE 1 TABLET BY MOUTH TWICE A DAY      Last office visit with prescribing clinician: Visit date not found      Next office visit with prescribing clinician: Visit date not found            Earlene Steinberg MA  11/12/21, 16:43 CST

## 2021-11-15 ENCOUNTER — TRANSCRIBE ORDERS (OUTPATIENT)
Dept: ADMINISTRATIVE | Facility: HOSPITAL | Age: 61
End: 2021-11-15

## 2021-11-15 ENCOUNTER — LAB (OUTPATIENT)
Dept: LAB | Facility: HOSPITAL | Age: 61
End: 2021-11-15

## 2021-11-15 DIAGNOSIS — R10.9 ABDOMINAL PAIN, UNSPECIFIED ABDOMINAL LOCATION: Primary | ICD-10-CM

## 2021-11-15 DIAGNOSIS — R10.9 ABDOMINAL PAIN, UNSPECIFIED ABDOMINAL LOCATION: ICD-10-CM

## 2021-11-15 LAB
BACTERIA UR QL AUTO: ABNORMAL /HPF
BILIRUB UR QL STRIP: NEGATIVE
CLARITY UR: CLEAR
COLOR UR: YELLOW
DEPRECATED RDW RBC AUTO: 49.1 FL (ref 37–54)
EOSINOPHIL # BLD MANUAL: 0.37 10*3/MM3 (ref 0–0.4)
EOSINOPHIL NFR BLD MANUAL: 3 % (ref 0.3–6.2)
ERYTHROCYTE [DISTWIDTH] IN BLOOD BY AUTOMATED COUNT: 14.7 % (ref 12.3–15.4)
GLUCOSE UR STRIP-MCNC: NEGATIVE MG/DL
HCT VFR BLD AUTO: 44 % (ref 37.5–51)
HGB BLD-MCNC: 14.7 G/DL (ref 13–17.7)
HGB UR QL STRIP.AUTO: ABNORMAL
HYALINE CASTS UR QL AUTO: ABNORMAL /LPF
KETONES UR QL STRIP: NEGATIVE
LEUKOCYTE ESTERASE UR QL STRIP.AUTO: ABNORMAL
LYMPHOCYTES # BLD MANUAL: 1.48 10*3/MM3 (ref 0.7–3.1)
LYMPHOCYTES NFR BLD MANUAL: 12 % (ref 19.6–45.3)
LYMPHOCYTES NFR BLD MANUAL: 6 % (ref 5–12)
MCH RBC QN AUTO: 30.3 PG (ref 26.6–33)
MCHC RBC AUTO-ENTMCNC: 33.4 G/DL (ref 31.5–35.7)
MCV RBC AUTO: 90.7 FL (ref 79–97)
MONOCYTES # BLD AUTO: 0.74 10*3/MM3 (ref 0.1–0.9)
NEUTROPHILS # BLD AUTO: 9.77 10*3/MM3 (ref 1.7–7)
NEUTROPHILS NFR BLD MANUAL: 79 % (ref 42.7–76)
NITRITE UR QL STRIP: NEGATIVE
PH UR STRIP.AUTO: 5.5 [PH] (ref 5–8)
PLAT MORPH BLD: NORMAL
PLATELET # BLD AUTO: 352 10*3/MM3 (ref 140–450)
PMV BLD AUTO: 10.8 FL (ref 6–12)
PROT UR QL STRIP: NEGATIVE
RBC # BLD AUTO: 4.85 10*6/MM3 (ref 4.14–5.8)
RBC # UR: ABNORMAL /HPF
RBC MORPH BLD: NORMAL
REF LAB TEST METHOD: ABNORMAL
SP GR UR STRIP: 1.01 (ref 1–1.03)
SQUAMOUS #/AREA URNS HPF: ABNORMAL /HPF
UROBILINOGEN UR QL STRIP: ABNORMAL
WBC # BLD AUTO: 12.37 10*3/MM3 (ref 3.4–10.8)
WBC CLUMPS # UR AUTO: ABNORMAL /HPF
WBC MORPH BLD: NORMAL
WBC UR QL AUTO: ABNORMAL /HPF
YEAST URNS QL MICRO: ABNORMAL /HPF

## 2021-11-15 PROCEDURE — 85007 BL SMEAR W/DIFF WBC COUNT: CPT

## 2021-11-15 PROCEDURE — 80053 COMPREHEN METABOLIC PANEL: CPT | Performed by: FAMILY MEDICINE

## 2021-11-15 PROCEDURE — 87086 URINE CULTURE/COLONY COUNT: CPT

## 2021-11-15 PROCEDURE — 85027 COMPLETE CBC AUTOMATED: CPT

## 2021-11-15 PROCEDURE — 36415 COLL VENOUS BLD VENIPUNCTURE: CPT

## 2021-11-15 PROCEDURE — 87077 CULTURE AEROBIC IDENTIFY: CPT

## 2021-11-15 PROCEDURE — 81001 URINALYSIS AUTO W/SCOPE: CPT

## 2021-11-15 PROCEDURE — 87186 SC STD MICRODIL/AGAR DIL: CPT

## 2021-11-17 ENCOUNTER — HOSPITAL ENCOUNTER (OUTPATIENT)
Dept: CT IMAGING | Facility: HOSPITAL | Age: 61
End: 2021-11-17

## 2021-11-17 ENCOUNTER — TELEPHONE (OUTPATIENT)
Dept: INTERNAL MEDICINE | Facility: CLINIC | Age: 61
End: 2021-11-17

## 2021-11-17 LAB — BACTERIA SPEC AEROBE CULT: ABNORMAL

## 2021-11-17 NOTE — TELEPHONE ENCOUNTER
"Called patient to discuss lab results and recommendation for repeat labs. Patient was confused. He states that I'm telling him his labs were \"good\" and the surgeon he saw said his labs were \"bad\" and he has a CT scheduled for today. I advised that the surgeon has good reason to order tests based on their assessment of him. I also advised him to address any further questions to the surgeon regarding subsequent testing and to follow up with us in this office in 4-6 weeks as per recommendation of Dr. Oliveros. He voiced understanding and had no further questions.   "

## 2021-11-17 NOTE — TELEPHONE ENCOUNTER
----- Message from Sravanthi Oliveros DO sent at 11/15/2021  7:54 PM CST -----  Glucose 109   Renal function has returned to a normal baseline  Alk phos is elevated.   Repeat cmp in 4-6 weeks

## 2021-11-23 ENCOUNTER — TELEPHONE (OUTPATIENT)
Dept: INTERNAL MEDICINE | Facility: CLINIC | Age: 61
End: 2021-11-23

## 2021-11-23 NOTE — TELEPHONE ENCOUNTER
Pt states he is not getting any better and is super worried. He states Dr. Rebolledo ordered him a CT scan for tomorrow and has a referral for gastro for Rafat. He states he is up all night taking zofran and can't keep anything down. Pt is wondering if you have any additional recommendations that he can do to help with issues.

## 2021-11-23 NOTE — TELEPHONE ENCOUNTER
Caller: Jalil Darby    Relationship: Self    Best call back numbeR [phone]    What is the best time to reach you: ANYTIME         What was the call regarding: TEST GETTING RUN AT DIFFERENT FACILITY     Do you require a callback: YES

## 2021-11-23 NOTE — TELEPHONE ENCOUNTER
Patient aware of recommendation and voiced understanding. States he is going to try to tough it out, but if he gets worse he is going to go to the ER. Hoping that CT scan will give him answers.

## 2021-11-24 ENCOUNTER — HOSPITAL ENCOUNTER (OUTPATIENT)
Dept: CT IMAGING | Facility: HOSPITAL | Age: 61
End: 2021-11-24

## 2021-12-23 DIAGNOSIS — M79.661 PAIN IN BOTH LOWER LEGS: ICD-10-CM

## 2021-12-23 DIAGNOSIS — M79.662 PAIN IN BOTH LOWER LEGS: ICD-10-CM

## 2021-12-23 NOTE — TELEPHONE ENCOUNTER
Rx Refill Note  Requested Prescriptions     Pending Prescriptions Disp Refills   • gabapentin (NEURONTIN) 300 MG capsule [Pharmacy Med Name: GABAPENTIN 300 MG CAPSULE] 240 capsule      Sig: TAKE 2 CAPSULES BY MOUTH 4 (FOUR) TIMES A DAY      Last office visit with prescribing clinician: 10/28/2021      Next office visit with prescribing clinician: Visit date not found     No UDS on file.        Earlene Steinberg MA  12/23/21, 12:58 CST

## 2021-12-28 RX ORDER — GABAPENTIN 300 MG/1
CAPSULE ORAL
Qty: 240 CAPSULE | Refills: 2 | Status: SHIPPED | OUTPATIENT
Start: 2021-12-28 | End: 2022-03-10 | Stop reason: SDUPTHER

## 2022-02-14 DIAGNOSIS — N40.1 BENIGN PROSTATIC HYPERPLASIA (BPH) WITH STRAINING ON URINATION: ICD-10-CM

## 2022-02-14 DIAGNOSIS — R39.16 BENIGN PROSTATIC HYPERPLASIA (BPH) WITH STRAINING ON URINATION: ICD-10-CM

## 2022-02-14 RX ORDER — ONDANSETRON 4 MG/1
TABLET, ORALLY DISINTEGRATING ORAL
Qty: 30 TABLET | Refills: 3 | Status: SHIPPED | OUTPATIENT
Start: 2022-02-14 | End: 2022-03-10 | Stop reason: SDUPTHER

## 2022-02-16 ENCOUNTER — OFFICE VISIT (OUTPATIENT)
Dept: INTERNAL MEDICINE | Facility: CLINIC | Age: 62
End: 2022-02-16

## 2022-02-16 DIAGNOSIS — R39.16 BENIGN PROSTATIC HYPERPLASIA (BPH) WITH STRAINING ON URINATION: ICD-10-CM

## 2022-02-16 DIAGNOSIS — N40.1 BENIGN PROSTATIC HYPERPLASIA (BPH) WITH STRAINING ON URINATION: ICD-10-CM

## 2022-02-16 DIAGNOSIS — I10 PRIMARY HYPERTENSION: Primary | ICD-10-CM

## 2022-02-16 DIAGNOSIS — J06.9 UPPER RESPIRATORY TRACT INFECTION, UNSPECIFIED TYPE: ICD-10-CM

## 2022-02-16 PROCEDURE — 99442 PR PHYS/QHP TELEPHONE EVALUATION 11-20 MIN: CPT | Performed by: NURSE PRACTITIONER

## 2022-02-16 RX ORDER — AMOXICILLIN AND CLAVULANATE POTASSIUM 875; 125 MG/1; MG/1
1 TABLET, FILM COATED ORAL 2 TIMES DAILY
Qty: 20 TABLET | Refills: 0 | Status: SHIPPED | OUTPATIENT
Start: 2022-02-16 | End: 2022-04-04

## 2022-02-16 RX ORDER — OXYBUTYNIN CHLORIDE 5 MG/1
5 TABLET ORAL 3 TIMES DAILY
Qty: 30 TABLET | Refills: 0 | Status: SHIPPED | OUTPATIENT
Start: 2022-02-16 | End: 2022-04-04

## 2022-02-16 NOTE — PROGRESS NOTES
Subjective   Jalil Darby is a 62 y.o. male.   Chief Complaint   Patient presents with   • URI     Congestion x 1 wk, mild fever in the beginning, constant cough   • Med Refill     bp med       You have chosen to receive care through a telephone visit. Do you consent to use a telephone visit for your medical care today? Yes    URI   This is a new problem. The current episode started in the past 7 days. The problem has been gradually worsening. The maximum temperature recorded prior to his arrival was 100.4 - 100.9 F. The fever has been present for 1 to 2 days. Associated symptoms include congestion, coughing, headaches, a plugged ear sensation, rhinorrhea, sinus pain and sneezing. Pertinent negatives include no abdominal pain, chest pain, diarrhea, dysuria, ear pain, nausea, neck pain, rash, sore throat, swollen glands, vomiting or wheezing. Treatments tried: chaitanya seltz Plus and robtissum. The treatment provided mild relief.        The following portions of the patient's history were reviewed and updated as appropriate: allergies, current medications, past family history, past medical history, past social history, past surgical history and problem list.    Review of Systems   Constitutional: Positive for fatigue. Negative for activity change, appetite change, fever, unexpected weight gain and unexpected weight loss.   HENT: Positive for congestion, postnasal drip, rhinorrhea, sinus pressure and sneezing. Negative for ear pain, sore throat, swollen glands, trouble swallowing and voice change.    Eyes: Negative for blurred vision and visual disturbance.   Respiratory: Positive for cough. Negative for chest tightness, shortness of breath and wheezing.    Cardiovascular: Negative for chest pain, palpitations and leg swelling.   Gastrointestinal: Negative for abdominal pain, constipation, diarrhea, nausea, vomiting and indigestion.   Endocrine: Negative for cold intolerance, heat intolerance, polydipsia and polyphagia.    Genitourinary: Negative for dysuria and frequency.   Musculoskeletal: Negative for arthralgias, back pain, joint swelling and neck pain.   Skin: Negative for color change, rash and skin lesions.   Allergic/Immunologic: Positive for environmental allergies.   Neurological: Negative for dizziness, weakness, headache, memory problem and confusion.   Hematological: Does not bruise/bleed easily.   Psychiatric/Behavioral: Negative for agitation, hallucinations and suicidal ideas. The patient is not nervous/anxious.        Objective   Past Medical History:   Diagnosis Date   • Coronary artery disease    • Diverticulosis    • Hx of heart artery stent     15 years ago x2 stents    • Hypertension    • Kidney stones       Past Surgical History:   Procedure Laterality Date   • APPENDECTOMY     • CARDIAC CATHETERIZATION      15 years ago 2 stents per Dr. Humphrey    • CYSTOSCOPY TRANSURETHRAL RESECTION OF PROSTATE N/A 9/7/2021    Procedure: CYSTOSCOPY TRANSURETHRAL RESECTION OF PROSTATE;  Surgeon: Ervin Alonzo MD;  Location: [facility];  Service: Urology;  Laterality: N/A;   • KNEE ARTHROPLASTY Right    • PERIPHERAL ARTERIAL STENT GRAFT     • TOTAL HIP ARTHROPLASTY Right         Current Outpatient Medications:   •  albuterol sulfate  (90 Base) MCG/ACT inhaler, Inhale 2 puffs Every 4 (Four) Hours As Needed for Wheezing., Disp: 18 g, Rfl: 6  •  docusate sodium (Colace) 100 MG capsule, Take 1 capsule by mouth 2 (Two) Times a Day., Disp: 60 capsule, Rfl: 0  •  furosemide (LASIX) 20 MG tablet, TAKE 1 TABLET BY MOUTH EVERY DAY, Disp: 30 tablet, Rfl: 0  •  gabapentin (NEURONTIN) 300 MG capsule, TAKE 2 CAPSULES BY MOUTH 4 (FOUR) TIMES A DAY, Disp: 240 capsule, Rfl: 2  •  glucose blood test strip, Use as instructed, Disp: 150 each, Rfl: 11  •  glucose monitor monitoring kit, 1 each 4 (Four) Times a Day Before Meals & at Bedtime., Disp: 150 each, Rfl: 11  •  HYDROcodone-acetaminophen (Norco) 5-325 MG per tablet, Take 1 tablet by  mouth Every 6 (Six) Hours As Needed for Moderate Pain ., Disp: 30 tablet, Rfl: 0  •  HYDROcodone-acetaminophen (NORCO) 5-325 MG per tablet, Take 1 tablet by mouth Every 6 (Six) Hours As Needed (postop pain)., Disp: 12 tablet, Rfl: 0  •  Lancets 30G misc, 1 each 4 (Four) Times a Day Before Meals & at Bedtime., Disp: 150 each, Rfl: 11  •  metoprolol tartrate (LOPRESSOR) 25 MG tablet, Take 1 tablet by mouth 2 (Two) Times a Day., Disp: 60 tablet, Rfl: 4  •  naproxen (NAPROSYN) 500 MG tablet, TAKE 1 TABLET BY MOUTH TWICE A DAY WITH MEALS, Disp: 60 tablet, Rfl: 0  •  nystatin (MYCOSTATIN) 828785 UNIT/ML suspension, Swish and swallow 5 mL 4 (Four) Times a Day., Disp: 473 mL, Rfl: 1  •  ondansetron ODT (ZOFRAN-ODT) 4 MG disintegrating tablet, PLACE 1 TABLET ON THE TONGUE EVERY 8 (EIGHT) HOURS AS NEEDED FOR NAUSEA OR VOMITING., Disp: 30 tablet, Rfl: 3  •  amoxicillin-clavulanate (Augmentin) 875-125 MG per tablet, Take 1 tablet by mouth 2 (Two) Times a Day., Disp: 20 tablet, Rfl: 0  •  oxybutynin (DITROPAN) 5 MG tablet, Take 1 tablet by mouth Every 8 (Eight) Hours As Needed (bladder spasms)., Disp: 30 tablet, Rfl: 1  •  oxybutynin (DITROPAN) 5 MG tablet, Take 1 tablet by mouth 3 (Three) Times a Day., Disp: 30 tablet, Rfl: 0  •  phenazopyridine (PYRIDIUM) 100 MG tablet, Take 1 tablet by mouth 3 (Three) Times a Day As Needed (urinary burning)., Disp: 20 tablet, Rfl: 1      There were no vitals filed for this visit.  There were no vitals filed for this visit.    There is no height or weight on file to calculate BMI.    Physical Exam n/a; telephone visit           Assessment/Plan   Diagnoses and all orders for this visit:    1. Primary hypertension (Primary)  -     metoprolol tartrate (LOPRESSOR) 25 MG tablet; Take 1 tablet by mouth 2 (Two) Times a Day.  Dispense: 60 tablet; Refill: 4    2. Benign prostatic hyperplasia (BPH) with straining on urination  -     oxybutynin (DITROPAN) 5 MG tablet; Take 1 tablet by mouth 3 (Three)  Times a Day.  Dispense: 30 tablet; Refill: 0    3. Upper respiratory tract infection, unspecified type  -     amoxicillin-clavulanate (Augmentin) 875-125 MG per tablet; Take 1 tablet by mouth 2 (Two) Times a Day.  Dispense: 20 tablet; Refill: 0      This visit has been rescheduled as a phone visit to comply with patient safety concerns in accordance with CDC recommendations. Total time of discussion was 20 minutes.      Patient requesting refills on medicines. He is reporting that he is due for gabapentin and requesting a refill. Will send request to Dr. Oliveros for only 1 month. Will have to be seen in the office if needing further refill for pain assessment visit.     Patient has symptoms as described over the telephone visit of URI and chest congestion. Most likely bacterial infection related to 7-10 days from symptom onset without significant improvement with OTC therapies. Patient will call the office on Friday if symptoms have not improved will send to complete a COVID test. Declined testing today.

## 2022-03-10 DIAGNOSIS — M79.662 PAIN IN BOTH LOWER LEGS: ICD-10-CM

## 2022-03-10 DIAGNOSIS — M79.661 PAIN IN BOTH LOWER LEGS: ICD-10-CM

## 2022-03-10 DIAGNOSIS — N40.1 BENIGN PROSTATIC HYPERPLASIA (BPH) WITH STRAINING ON URINATION: ICD-10-CM

## 2022-03-10 DIAGNOSIS — R39.16 BENIGN PROSTATIC HYPERPLASIA (BPH) WITH STRAINING ON URINATION: ICD-10-CM

## 2022-03-10 NOTE — TELEPHONE ENCOUNTER
Caller: Jalil Darby    Relationship: Self    Best call back number: [phone]    Requested Prescriptions:   Requested Prescriptions     Pending Prescriptions Disp Refills   • gabapentin (NEURONTIN) 300 MG capsule 240 capsule 2   • ondansetron ODT (ZOFRAN-ODT) 4 MG disintegrating tablet 30 tablet 3        Pharmacy where request should be sent: [facility]/PHARMACY #[id number] - [facility] [address] [phone] [facility] [phone] FX     Additional details provided by patient: PATIENT REPORTS HE IS OUT OF ZOFRAN COMPLETELY AND THAT HIS GABAPENTIN IS NOT DUE TO BE FILLED UNTIL THE 22ND. PROVIDER REQUESTED RESCHEDULE FOR NEXT APPOINTMENT. HUB ASSISTED AND NEW APPOINTMENT DATE IS 4/7/22.    Does the patient have less than a 3 day supply:  [x] Yes  [] No    Alana Ruffin Rep   03/10/22 16:26 CST

## 2022-03-11 RX ORDER — ONDANSETRON 4 MG/1
4 TABLET, ORALLY DISINTEGRATING ORAL EVERY 8 HOURS PRN
Qty: 30 TABLET | Refills: 3 | Status: SHIPPED | OUTPATIENT
Start: 2022-03-11 | End: 2022-04-04 | Stop reason: SDUPTHER

## 2022-03-11 RX ORDER — GABAPENTIN 300 MG/1
600 CAPSULE ORAL 4 TIMES DAILY
Qty: 240 CAPSULE | Refills: 0 | Status: SHIPPED | OUTPATIENT
Start: 2022-03-22

## 2022-03-28 DIAGNOSIS — M79.662 PAIN IN BOTH LOWER LEGS: ICD-10-CM

## 2022-03-28 DIAGNOSIS — M79.661 PAIN IN BOTH LOWER LEGS: ICD-10-CM

## 2022-03-28 RX ORDER — GABAPENTIN 300 MG/1
600 CAPSULE ORAL 4 TIMES DAILY
Qty: 240 CAPSULE | Refills: 0 | OUTPATIENT
Start: 2022-03-28

## 2022-04-04 ENCOUNTER — OFFICE VISIT (OUTPATIENT)
Dept: INTERNAL MEDICINE | Facility: CLINIC | Age: 62
End: 2022-04-04

## 2022-04-04 VITALS
TEMPERATURE: 98.2 F | WEIGHT: 199 LBS | SYSTOLIC BLOOD PRESSURE: 148 MMHG | BODY MASS INDEX: 28.49 KG/M2 | HEART RATE: 89 BPM | HEIGHT: 70 IN | OXYGEN SATURATION: 98 % | DIASTOLIC BLOOD PRESSURE: 88 MMHG | RESPIRATION RATE: 16 BRPM

## 2022-04-04 DIAGNOSIS — I10 PRIMARY HYPERTENSION: ICD-10-CM

## 2022-04-04 DIAGNOSIS — N40.1 BENIGN PROSTATIC HYPERPLASIA (BPH) WITH STRAINING ON URINATION: ICD-10-CM

## 2022-04-04 DIAGNOSIS — R39.16 BENIGN PROSTATIC HYPERPLASIA (BPH) WITH STRAINING ON URINATION: ICD-10-CM

## 2022-04-04 DIAGNOSIS — M79.661 PAIN IN BOTH LOWER LEGS: Primary | ICD-10-CM

## 2022-04-04 DIAGNOSIS — E11.9 TYPE 2 DIABETES MELLITUS WITHOUT COMPLICATION, WITHOUT LONG-TERM CURRENT USE OF INSULIN: ICD-10-CM

## 2022-04-04 DIAGNOSIS — M79.662 PAIN IN BOTH LOWER LEGS: Primary | ICD-10-CM

## 2022-04-04 DIAGNOSIS — Z79.899 ENCOUNTER FOR LONG-TERM (CURRENT) USE OF MEDICATIONS: ICD-10-CM

## 2022-04-04 PROCEDURE — 99214 OFFICE O/P EST MOD 30 MIN: CPT | Performed by: NURSE PRACTITIONER

## 2022-04-04 RX ORDER — ONDANSETRON 4 MG/1
4 TABLET, ORALLY DISINTEGRATING ORAL EVERY 8 HOURS PRN
Qty: 30 TABLET | Refills: 3 | Status: SHIPPED | OUTPATIENT
Start: 2022-04-04 | End: 2022-05-20 | Stop reason: SDUPTHER

## 2022-04-04 RX ORDER — ALBUTEROL SULFATE 90 UG/1
2 AEROSOL, METERED RESPIRATORY (INHALATION) EVERY 4 HOURS PRN
Qty: 18 G | Refills: 6 | Status: SHIPPED | OUTPATIENT
Start: 2022-04-04

## 2022-04-04 RX ORDER — GABAPENTIN 600 MG/1
600 TABLET ORAL 4 TIMES DAILY
Qty: 120 TABLET | Refills: 1 | Status: SHIPPED | OUTPATIENT
Start: 2022-04-04

## 2022-04-04 NOTE — TELEPHONE ENCOUNTER
Caller: TRINITY VEE    Relationship: Emergency Contact    Best call back number: [phone]  Requested Prescriptions:   Requested Prescriptions     Pending Prescriptions Disp Refills   • ondansetron ODT (ZOFRAN-ODT) 4 MG disintegrating tablet 30 tablet 3     Sig: Place 1 tablet on the tongue Every 8 (Eight) Hours As Needed for Nausea or Vomiting.        Pharmacy where request should be sent: [facility]/PHARMACY #[id number] - [facility] [address] [phone] [facility] [phone]      Additional details provided by patient: WIFE CALLED IN STATING MEDICATION WASN'T CALLED IN WITH OTHER MEDICATION AND HE REALLY NEEDS IT.    Does the patient have less than a 3 day supply:  [x] Yes  [] No    Alana Mauricio Rep   04/04/22 15:54 CDT

## 2022-04-04 NOTE — TELEPHONE ENCOUNTER
Rx Refill Note  Requested Prescriptions     Pending Prescriptions Disp Refills   • ondansetron ODT (ZOFRAN-ODT) 4 MG disintegrating tablet 30 tablet 3     Sig: Place 1 tablet on the tongue Every 8 (Eight) Hours As Needed for Nausea or Vomiting.      Last office visit with prescribing clinician: 4/4/2022      Next office visit with prescribing clinician: 5/5/2022            Johana Campbell RN  04/04/22, 15:57 CDT

## 2022-04-04 NOTE — PROGRESS NOTES
Subjective     Chief Complaint   Patient presents with   • Hypertension   • Hip Pain       History of Present Illness  Patient presents today for medication refill. He was prior patient of Dr. Oliveros. He has been out of medications since March 22nd.   He does have left hip pain. Has been taking tylenol with minimal relief. Blood sugars have been running about 100. His blood pressure has been running about 140/86. He carries a history of coronary artery disease and states he started smoking after he had a stent placed. Patient has been trying to stop smoking. He was averaging about 2.5ppd and now down to 1ppd.      Review of Systems   Respiratory: Negative for cough, chest tightness and shortness of breath.    Cardiovascular: Negative for chest pain and palpitations.   Gastrointestinal: Negative for abdominal pain, diarrhea, nausea and vomiting.   Genitourinary: Negative for difficulty urinating.   Musculoskeletal:        Left hip pain   All other systems reviewed and are negative.       Otherwise complete ROS reviewed and negative except as mentioned in the HPI.    Past Medical History:   Past Medical History:   Diagnosis Date   • Coronary artery disease    • Diverticulosis    • Hx of heart artery stent     15 years ago x2 stents    • Hypertension    • Kidney stones      Past Surgical History:  Past Surgical History:   Procedure Laterality Date   • APPENDECTOMY     • CARDIAC CATHETERIZATION      15 years ago 2 stents per Dr. Humphrey    • CYSTOSCOPY TRANSURETHRAL RESECTION OF PROSTATE N/A 9/7/2021    Procedure: CYSTOSCOPY TRANSURETHRAL RESECTION OF PROSTATE;  Surgeon: Ervin Alonzo MD;  Location: [facility];  Service: Urology;  Laterality: N/A;   • KNEE ARTHROPLASTY Right    • PERIPHERAL ARTERIAL STENT GRAFT     • TOTAL HIP ARTHROPLASTY Right      Social History:  reports that he has been smoking cigarettes. He has a 10.00 pack-year smoking history. He has never used smokeless tobacco. He reports that he does  not drink alcohol and does not use drugs.    Family History: family history includes Diabetes in his mother; Heart disease in his father and mother.       Allergies:  Allergies   Allergen Reactions   • Contrast Dye Hives   • Levaquin [Levofloxacin] Nausea Only, Swelling, Dizziness and Angioedema     Medications:  Prior to Admission medications    Medication Sig Start Date End Date Taking? Authorizing Provider   albuterol sulfate  (90 Base) MCG/ACT inhaler Inhale 2 puffs Every 4 (Four) Hours As Needed for Wheezing. 8/12/21  Yes Sravanthi Oliveros DO   docusate sodium (Colace) 100 MG capsule Take 1 capsule by mouth 2 (Two) Times a Day. 9/8/21  Yes Ervin Alonzo MD   furosemide (LASIX) 20 MG tablet TAKE 1 TABLET BY MOUTH EVERY DAY 10/27/21  Yes Sravanthi Oliveros DO   gabapentin (NEURONTIN) 300 MG capsule Take 2 capsules by mouth 4 (Four) Times a Day. 3/22/22  Yes Sravanthi Oliveros DO   glucose blood test strip Use as instructed 11/5/21  Yes Sravanthi Oliveros DO   glucose monitor monitoring kit 1 each 4 (Four) Times a Day Before Meals & at Bedtime. 8/25/21  Yes Sravanthi Oliveros DO   HYDROcodone-acetaminophen (Norco) 5-325 MG per tablet Take 1 tablet by mouth Every 6 (Six) Hours As Needed for Moderate Pain . 8/25/21  Yes Sravanthi Oliveros DO   HYDROcodone-acetaminophen (NORCO) 5-325 MG per tablet Take 1 tablet by mouth Every 6 (Six) Hours As Needed (postop pain). 9/8/21  Yes Ervin Alonzo MD   Lancets 30G misc 1 each 4 (Four) Times a Day Before Meals & at Bedtime. 8/25/21  Yes Sravanthi Oliveros DO   metoprolol tartrate (LOPRESSOR) 25 MG tablet Take 1 tablet by mouth 2 (Two) Times a Day. 2/16/22  Yes Zaina Douglass APRN   nystatin (MYCOSTATIN) 807351 UNIT/ML suspension Swish and swallow 5 mL 4 (Four) Times a Day. 6/16/21  Yes Sravanthi Oliveros DO   ondansetron ODT (ZOFRAN-ODT) 4 MG disintegrating tablet Place 1 tablet on the tongue Every 8 (Eight) Hours As Needed for  "Nausea or Vomiting. 3/11/22  Yes Sravanthi Oliveros,    amoxicillin-clavulanate (Augmentin) 875-125 MG per tablet Take 1 tablet by mouth 2 (Two) Times a Day. 2/16/22   Zaina Douglass APRN   naproxen (NAPROSYN) 500 MG tablet TAKE 1 TABLET BY MOUTH TWICE A DAY WITH MEALS 11/12/21   Ervin Alonzo MD   oxybutynin (DITROPAN) 5 MG tablet Take 1 tablet by mouth Every 8 (Eight) Hours As Needed (bladder spasms). 9/8/21   Ervin Alonzo MD   oxybutynin (DITROPAN) 5 MG tablet Take 1 tablet by mouth 3 (Three) Times a Day. 2/16/22   Zaina Douglass APRN   phenazopyridine (PYRIDIUM) 100 MG tablet Take 1 tablet by mouth 3 (Three) Times a Day As Needed (urinary burning). 9/8/21   Ervin Alonzo MD       Objective     Vital Signs: /88 (BP Location: Right arm, Patient Position: Sitting, Cuff Size: Adult)   Pulse 89   Temp 98.2 °F (36.8 °C) (Infrared)   Resp 16   Ht 177.8 cm (70\")   Wt 90.3 kg (199 lb)   SpO2 98%   BMI 28.55 kg/m²   Physical Exam  Vitals and nursing note reviewed.   Constitutional:       Appearance: He is well-developed.   HENT:      Head: Normocephalic and atraumatic.   Eyes:      Pupils: Pupils are equal, round, and reactive to light.   Neck:      Vascular: No JVD.   Cardiovascular:      Rate and Rhythm: Normal rate and regular rhythm.   Pulmonary:      Effort: Pulmonary effort is normal.   Abdominal:      General: Bowel sounds are normal.      Palpations: Abdomen is soft.   Musculoskeletal:         General: No deformity.      Cervical back: Normal range of motion and neck supple.   Lymphadenopathy:      Cervical: No cervical adenopathy.   Skin:     General: Skin is warm and dry.   Neurological:      Mental Status: He is alert and oriented to person, place, and time.   Psychiatric:         Behavior: Behavior normal.         Thought Content: Thought content normal.         Judgment: Judgment normal.         Patient's Body mass index is 28.55 kg/m². indicating " that he is overweight (BMI 25-29.9). Patient's (Body mass index is 28.55 kg/m².) indicates that they are overweight with health conditions that include hypertension and diabetes mellitus . Weight is unchanged. BMI is is above average; BMI management plan is completed. We discussed portion control and increasing exercise.       Results Reviewed:  Glucose   Date Value Ref Range Status   11/15/2021 109 (H) 65 - 99 mg/dL Final     BUN   Date Value Ref Range Status   11/15/2021 13 8 - 23 mg/dL Final   06/24/2019 11 7 - 18 mg/dL Final     Creatinine   Date Value Ref Range Status   11/15/2021 1.15 0.76 - 1.27 mg/dL Final   06/24/2019 1.10 0.60 - 1.30 mg/dL Final     Sodium   Date Value Ref Range Status   11/15/2021 137 136 - 145 mmol/L Final   06/24/2019 138 135 - 145 mmol/L Final     Potassium   Date Value Ref Range Status   11/15/2021 5.0 3.5 - 5.2 mmol/L Final   06/24/2019 4.6 3.5 - 5.0 mmol/L Final     Chloride   Date Value Ref Range Status   11/15/2021 99 98 - 107 mmol/L Final   06/24/2019 101 98 - 107 mmol/L Final     CO2   Date Value Ref Range Status   11/15/2021 28.0 22.0 - 29.0 mmol/L Final     Total CO2   Date Value Ref Range Status   06/24/2019 26 21 - 32 mmol/L Final     Calcium   Date Value Ref Range Status   11/15/2021 9.4 8.6 - 10.5 mg/dL Final   06/24/2019 8.9 8.5 - 10.1 mg/dL Final     Comment:       Calcium measurements are adversely affected by the use of Omniscan during MRI. Analysis of calcium is not recommended for 12 to 24 hours after the use of the contrast agent.      ALT (SGPT)   Date Value Ref Range Status   11/15/2021 16 1 - 41 U/L Final   06/24/2019 33 16 - 62 U/L Final     AST (SGOT)   Date Value Ref Range Status   11/15/2021 16 1 - 40 U/L Final   06/24/2019 18 7 - 34 U/L Final     WBC   Date Value Ref Range Status   11/15/2021 12.37 (H) 3.40 - 10.80 10*3/mm3 Final   10/28/2021 8.8 3.4 - 10.8 x10E3/uL Final   06/24/2019 8.7 5.0 - 10.0 K/uL Final     Hematocrit   Date Value Ref Range Status    11/15/2021 44.0 37.5 - 51.0 % Final   06/24/2019 47.6 40.0 - 54.0 % Final     Platelets   Date Value Ref Range Status   11/15/2021 352 140 - 450 10*3/mm3 Final   06/24/2019 314 150 - 500 K/uL Final     Total Cholesterol   Date Value Ref Range Status   09/30/2018 190 130 - 200 mg/dL Final     Triglycerides   Date Value Ref Range Status   09/30/2018 244 (H) 0 - 149 mg/dL Final     HDL Cholesterol   Date Value Ref Range Status   09/30/2018 33 (L) >=40 mg/dL Final     LDL Cholesterol    Date Value Ref Range Status   09/30/2018 130 (H) 0 - 99 mg/dL Final     LDL/HDL Ratio   Date Value Ref Range Status   09/30/2018 3.28  Final     Hemoglobin A1C   Date Value Ref Range Status   10/28/2021 6.6 (H) 4.8 - 5.6 % Final     Comment:              Prediabetes: 5.7 - 6.4           Diabetes: >6.4           Glycemic control for adults with diabetes: <7.0     08/25/2021 6.4 % Final         Assessment / Plan     Assessment/Plan:  Diagnoses and all orders for this visit:    1. Pain in both lower legs (Primary)  -     gabapentin (NEURONTIN) 600 MG tablet; Take 1 tablet by mouth 4 (Four) Times a Day.  Dispense: 120 tablet; Refill: 1    2. Primary hypertension  -     CBC & Differential  -     Comprehensive Metabolic Panel  -     metoprolol tartrate (LOPRESSOR) 25 MG tablet; Take 1 tablet by mouth 2 (Two) Times a Day.  Dispense: 60 tablet; Refill: 4    3. Type 2 diabetes mellitus without complication, without long-term current use of insulin (HCC)  -     Hemoglobin A1c    4. Encounter for long-term (current) use of medications  -     Compliance Drug Analysis, Ur - Urine, Clean Catch    Other orders  -     albuterol sulfate  (90 Base) MCG/ACT inhaler; Inhale 2 puffs Every 4 (Four) Hours As Needed for Wheezing.  Dispense: 18 g; Refill: 6      Return in about 1 month (around 5/4/2022). unless patient needs to be seen sooner or acute issues arise.    Code Status: Full    I have discussed the patient results/orders and and  plan/recommendation with them at today's visit.      Lisy Melendrez, APRN   04/04/2022

## 2022-04-05 LAB
ALBUMIN SERPL-MCNC: 4.6 G/DL (ref 3.8–4.8)
ALBUMIN/GLOB SERPL: 1.4 {RATIO} (ref 1.2–2.2)
ALP SERPL-CCNC: 174 IU/L (ref 44–121)
ALT SERPL-CCNC: 31 IU/L (ref 0–44)
AST SERPL-CCNC: 22 IU/L (ref 0–40)
BASOPHILS # BLD AUTO: 0.1 X10E3/UL (ref 0–0.2)
BASOPHILS NFR BLD AUTO: 1 %
BILIRUB SERPL-MCNC: 0.5 MG/DL (ref 0–1.2)
BUN SERPL-MCNC: 15 MG/DL (ref 8–27)
BUN/CREAT SERPL: 14 (ref 10–24)
CALCIUM SERPL-MCNC: 9.9 MG/DL (ref 8.6–10.2)
CHLORIDE SERPL-SCNC: 100 MMOL/L (ref 96–106)
CO2 SERPL-SCNC: 24 MMOL/L (ref 20–29)
CREAT SERPL-MCNC: 1.09 MG/DL (ref 0.76–1.27)
EGFRCR SERPLBLD CKD-EPI 2021: 76 ML/MIN/1.73
EOSINOPHIL # BLD AUTO: 0.4 X10E3/UL (ref 0–0.4)
EOSINOPHIL NFR BLD AUTO: 5 %
ERYTHROCYTE [DISTWIDTH] IN BLOOD BY AUTOMATED COUNT: 13.8 % (ref 11.6–15.4)
GLOBULIN SER CALC-MCNC: 3.4 G/DL (ref 1.5–4.5)
GLUCOSE SERPL-MCNC: 91 MG/DL (ref 65–99)
HBA1C MFR BLD: 6.7 % (ref 4.8–5.6)
HCT VFR BLD AUTO: 48 % (ref 37.5–51)
HGB BLD-MCNC: 16.3 G/DL (ref 13–17.7)
IMM GRANULOCYTES # BLD AUTO: 0 X10E3/UL (ref 0–0.1)
IMM GRANULOCYTES NFR BLD AUTO: 0 %
LYMPHOCYTES # BLD AUTO: 2.4 X10E3/UL (ref 0.7–3.1)
LYMPHOCYTES NFR BLD AUTO: 27 %
MCH RBC QN AUTO: 29.8 PG (ref 26.6–33)
MCHC RBC AUTO-ENTMCNC: 34 G/DL (ref 31.5–35.7)
MCV RBC AUTO: 88 FL (ref 79–97)
MONOCYTES # BLD AUTO: 0.9 X10E3/UL (ref 0.1–0.9)
MONOCYTES NFR BLD AUTO: 10 %
NEUTROPHILS # BLD AUTO: 4.9 X10E3/UL (ref 1.4–7)
NEUTROPHILS NFR BLD AUTO: 57 %
PLATELET # BLD AUTO: 368 X10E3/UL (ref 150–450)
POTASSIUM SERPL-SCNC: 5 MMOL/L (ref 3.5–5.2)
PROT SERPL-MCNC: 8 G/DL (ref 6–8.5)
RBC # BLD AUTO: 5.47 X10E6/UL (ref 4.14–5.8)
SODIUM SERPL-SCNC: 139 MMOL/L (ref 134–144)
WBC # BLD AUTO: 8.7 X10E3/UL (ref 3.4–10.8)

## 2022-04-07 DIAGNOSIS — R79.89 ELEVATED LIVER FUNCTION TESTS: Primary | ICD-10-CM

## 2022-04-09 LAB — DRUGS UR: NORMAL

## 2022-04-11 ENCOUNTER — TELEPHONE (OUTPATIENT)
Dept: INTERNAL MEDICINE | Facility: CLINIC | Age: 62
End: 2022-04-11

## 2022-04-11 LAB
ALP SERPL-CCNC: 169 IU/L (ref 44–121)
WRITTEN AUTHORIZATION: NORMAL

## 2022-04-11 NOTE — TELEPHONE ENCOUNTER
Caller: Jalil Darby    Relationship: Self    Best call back number: [phone]    What is the best time to reach you: ANY     Who are you requesting to speak with (clinical staff, provider,  specific staff member):      What was the call regarding: PATIENT IS REQUESTING A CONTACT TO [employer] TO CONFIRM HIS WELL CHECK APPOINTMENT FOR THEIR REWARD PROGRAM.     Do you require a callback: NO

## 2022-04-13 NOTE — TELEPHONE ENCOUNTER
I called PT and had to LVM.  I stated PT would need to call his insurance and the phone number can be found on the back of the card.  I also mentioned it's up to the PT to call his insurance to find out what they will cover.

## 2022-04-13 NOTE — PROGRESS NOTES
Called pt with results. Pt voiced understanding. Pt stated he would like to repeat UDS at next visit. I let pt know that gabapentin would still not written because of positive UDS for methamphetamine and marijuana. Pt voiced understanding and stated that he still wanted to repeat it.

## 2022-05-19 ENCOUNTER — TELEPHONE (OUTPATIENT)
Dept: INTERNAL MEDICINE | Facility: CLINIC | Age: 62
End: 2022-05-19

## 2022-05-19 NOTE — TELEPHONE ENCOUNTER
PT needs refill of following, called into [facility] in [place].    ondansetron ODT (ZOFRAN-ODT) 4 MG disintegrating tablet [[id number]] (Order [id number])

## 2022-05-19 NOTE — TELEPHONE ENCOUNTER
Rx Refill Note  Requested Prescriptions      No prescriptions requested or ordered in this encounter      Last office visit with prescribing clinician: 4/4/2022      Next office visit with prescribing clinician: Visit date not found            Earlene Steinberg MA  05/19/22, 17:21 CDT

## 2022-05-20 DIAGNOSIS — N40.1 BENIGN PROSTATIC HYPERPLASIA (BPH) WITH STRAINING ON URINATION: ICD-10-CM

## 2022-05-20 DIAGNOSIS — R39.16 BENIGN PROSTATIC HYPERPLASIA (BPH) WITH STRAINING ON URINATION: ICD-10-CM

## 2022-05-20 RX ORDER — ONDANSETRON 4 MG/1
4 TABLET, ORALLY DISINTEGRATING ORAL EVERY 8 HOURS PRN
Qty: 30 TABLET | Refills: 3 | Status: SHIPPED | OUTPATIENT
Start: 2022-05-20

## 2022-08-19 DIAGNOSIS — I10 PRIMARY HYPERTENSION: ICD-10-CM

## 2022-08-19 NOTE — TELEPHONE ENCOUNTER
Rx Refill Note  Requested Prescriptions     Pending Prescriptions Disp Refills   • metoprolol tartrate (LOPRESSOR) 25 MG tablet [Pharmacy Med Name: METOPROLOL TARTRATE 25 MG TAB] 180 tablet 1     Sig: TAKE 1 TABLET BY MOUTH TWICE A DAY      Last office visit with prescribing clinician: 4/4/2022      Next office visit with prescribing clinician: Visit date not found            Martine Mcgee CMA  08/19/22, 07:35 CDT

## 2023-01-24 ENCOUNTER — LAB (OUTPATIENT)
Dept: LAB | Facility: HOSPITAL | Age: 63
End: 2023-01-24
Payer: MEDICARE

## 2023-01-24 ENCOUNTER — HOSPITAL ENCOUNTER (OUTPATIENT)
Dept: CT IMAGING | Facility: HOSPITAL | Age: 63
Discharge: HOME | End: 2023-01-24
Payer: MEDICARE

## 2023-01-24 ENCOUNTER — OFFICE VISIT (OUTPATIENT)
Dept: INTERNAL MEDICINE | Facility: CLINIC | Age: 63
End: 2023-01-24
Payer: MEDICARE

## 2023-01-24 VITALS
SYSTOLIC BLOOD PRESSURE: 138 MMHG | DIASTOLIC BLOOD PRESSURE: 82 MMHG | OXYGEN SATURATION: 97 % | HEIGHT: 70 IN | WEIGHT: 202.2 LBS | BODY MASS INDEX: 28.95 KG/M2 | HEART RATE: 80 BPM | TEMPERATURE: 97.1 F

## 2023-01-24 DIAGNOSIS — R11.0 NAUSEA: ICD-10-CM

## 2023-01-24 DIAGNOSIS — R10.12 LUQ ABDOMINAL PAIN: Primary | ICD-10-CM

## 2023-01-24 DIAGNOSIS — R10.12 LUQ ABDOMINAL PAIN: ICD-10-CM

## 2023-01-24 DIAGNOSIS — R10.32 LLQ ABDOMINAL PAIN: ICD-10-CM

## 2023-01-24 LAB
ALBUMIN SERPL-MCNC: 4.3 G/DL (ref 3.5–5)
ALBUMIN/GLOB SERPL: 1 G/DL (ref 1.1–2.5)
ALP SERPL-CCNC: 142 U/L (ref 24–120)
ALT SERPL W P-5'-P-CCNC: 35 U/L (ref 0–50)
AMYLASE SERPL-CCNC: 80 U/L (ref 30–110)
ANION GAP SERPL CALCULATED.3IONS-SCNC: 13 MMOL/L (ref 4–13)
AST SERPL-CCNC: 33 U/L (ref 7–45)
AUTO MIXED CELLS #: 1.1 10*3/MM3 (ref 0.1–2.6)
AUTO MIXED CELLS %: 11.8 % (ref 0.1–24)
BILIRUB SERPL-MCNC: 0.9 MG/DL (ref 0.1–1)
BUN SERPL-MCNC: 14 MG/DL (ref 5–21)
BUN/CREAT SERPL: 12.7
CALCIUM SPEC-SCNC: 9 MG/DL (ref 8.4–10.4)
CHLORIDE SERPL-SCNC: 103 MMOL/L (ref 98–110)
CO2 SERPL-SCNC: 20 MMOL/L (ref 24–31)
CREAT SERPL-MCNC: 1.1 MG/DL (ref 0.5–1.4)
EGFRCR SERPLBLD CKD-EPI 2021: 74.5 ML/MIN/1.73
ERYTHROCYTE [DISTWIDTH] IN BLOOD BY AUTOMATED COUNT: 14 % (ref 12.3–15.4)
ERYTHROCYTE [SEDIMENTATION RATE] IN BLOOD: 17 MM/HR (ref 0–20)
GLOBULIN UR ELPH-MCNC: 4.1 GM/DL
GLUCOSE SERPL-MCNC: 136 MG/DL (ref 70–100)
HCT VFR BLD AUTO: 49.9 % (ref 37.5–51)
HGB BLD-MCNC: 16.6 G/DL (ref 13–17.7)
LIPASE SERPL-CCNC: 86 U/L (ref 23–203)
LYMPHOCYTES # BLD AUTO: 2.8 10*3/MM3 (ref 0.7–3.1)
LYMPHOCYTES NFR BLD AUTO: 29.5 % (ref 19.6–45.3)
MCH RBC QN AUTO: 29.7 PG (ref 26.6–33)
MCHC RBC AUTO-ENTMCNC: 33.3 G/DL (ref 31.5–35.7)
MCV RBC AUTO: 89.3 FL (ref 79–97)
NEUTROPHILS NFR BLD AUTO: 5.5 10*3/MM3 (ref 1.7–7)
NEUTROPHILS NFR BLD AUTO: 58.7 % (ref 42.7–76)
PLATELET # BLD AUTO: 186 10*3/MM3 (ref 140–450)
PMV BLD AUTO: 10.1 FL (ref 6–12)
POTASSIUM SERPL-SCNC: 4.7 MMOL/L (ref 3.5–5.3)
PROT SERPL-MCNC: 8.4 G/DL (ref 6.3–8.7)
RBC # BLD AUTO: 5.59 10*6/MM3 (ref 4.14–5.8)
SODIUM SERPL-SCNC: 136 MMOL/L (ref 135–145)
WBC NRBC COR # BLD: 9.4 10*3/MM3 (ref 3.4–10.8)

## 2023-01-24 PROCEDURE — 80053 COMPREHEN METABOLIC PANEL: CPT

## 2023-01-24 PROCEDURE — 82150 ASSAY OF AMYLASE: CPT

## 2023-01-24 PROCEDURE — 36415 COLL VENOUS BLD VENIPUNCTURE: CPT

## 2023-01-24 PROCEDURE — 85652 RBC SED RATE AUTOMATED: CPT

## 2023-01-24 PROCEDURE — 83690 ASSAY OF LIPASE: CPT

## 2023-01-24 PROCEDURE — 99213 OFFICE O/P EST LOW 20 MIN: CPT

## 2023-01-24 PROCEDURE — 85025 COMPLETE CBC W/AUTO DIFF WBC: CPT

## 2023-01-24 RX ORDER — ONDANSETRON 4 MG/1
4 TABLET, FILM COATED ORAL EVERY 8 HOURS PRN
Qty: 20 TABLET | Refills: 0 | Status: SHIPPED | OUTPATIENT
Start: 2023-01-24 | End: 2023-02-08 | Stop reason: SDUPTHER

## 2023-01-24 NOTE — PROGRESS NOTES
Subjective     Chief Complaint:  Left-sided abdominal pain    HPI:  Patient presents today with complaints of left-sided abdominal pain.  He reports that he has had abdominal pain for approximately 1 year but it has gotten progressively worse over the last 2-3 weeks.  He describes the pain as constant burning.  He does have a history of diverticulitis and feels that this pain is slightly different.  He has some left lower quadrant cramping but feels that the majority of his pain is located in the left upper quadrant.  He states that the pain wakes him up in the middle of the night.  He denies any changes in bowel pattern.  His last bowel movement was this morning.  He states that when he has diverticulitis, he usually gets relief from having a bowel movement but his abdominal pain has been unchanged after having a bowel movement with this current episode.  His pain is associated with nausea.  He denies fever or chills.  He denies vomiting.  He denies any changes in his diet.    Past Medical History:   Past Medical History:   Diagnosis Date   • Coronary artery disease    • Diverticulosis    • Hx of heart artery stent     15 years ago x2 stents    • Hypertension    • Kidney stones      Past Surgical History:  Past Surgical History:   Procedure Laterality Date   • APPENDECTOMY     • CARDIAC CATHETERIZATION      15 years ago 2 stents per Dr. Humphrey    • CYSTOSCOPY TRANSURETHRAL RESECTION OF PROSTATE N/A 9/7/2021    Procedure: CYSTOSCOPY TRANSURETHRAL RESECTION OF PROSTATE;  Surgeon: Ervin Alonzo MD;  Location: [facility] OR;  Service: Urology;  Laterality: N/A;   • KNEE ARTHROPLASTY Right    • PERIPHERAL ARTERIAL STENT GRAFT     • TOTAL HIP ARTHROPLASTY Right        Allergies:  Allergies   Allergen Reactions   • Contrast Dye (Echo Or Unknown Ct/Mr) Hives   • Levaquin [Levofloxacin] Nausea Only, Swelling, Dizziness and Angioedema     Medications:  Prior to Admission medications    Medication Sig Start Date End  "Date Taking? Authorizing Provider   albuterol sulfate  (90 Base) MCG/ACT inhaler Inhale 2 puffs Every 4 (Four) Hours As Needed for Wheezing. 4/4/22  Yes Lisy Melendrez APRN   docusate sodium (Colace) 100 MG capsule Take 1 capsule by mouth 2 (Two) Times a Day. 9/8/21  Yes Ervin Alonzo MD   gabapentin (NEURONTIN) 300 MG capsule Take 2 capsules by mouth 4 (Four) Times a Day. 3/22/22  Yes Sravanthi Oliveros DO   glucose blood test strip Use as instructed 11/5/21  Yes Sravanthi Oliveros DO   glucose monitor monitoring kit 1 each 4 (Four) Times a Day Before Meals & at Bedtime. 8/25/21  Yes Sravanthi Oliveros DO   HYDROcodone-acetaminophen (Norco) 5-325 MG per tablet Take 1 tablet by mouth Every 6 (Six) Hours As Needed for Moderate Pain . 8/25/21  Yes Sravanthi Oliveros DO   Lancets 30G misc 1 each 4 (Four) Times a Day Before Meals & at Bedtime. 8/25/21  Yes Sravanthi Oliveros DO   metoprolol tartrate (LOPRESSOR) 25 MG tablet TAKE 1 TABLET BY MOUTH TWICE A DAY 8/19/22  Yes Lisy Melendrez APRN   nystatin (MYCOSTATIN) 049199 UNIT/ML suspension Swish and swallow 5 mL 4 (Four) Times a Day. 6/16/21  Yes Sravanthi Oliveros DO   ondansetron ODT (ZOFRAN-ODT) 4 MG disintegrating tablet Place 1 tablet on the tongue Every 8 (Eight) Hours As Needed for Nausea or Vomiting. 5/20/22  Yes Lisy Melendrez APRN   furosemide (LASIX) 20 MG tablet TAKE 1 TABLET BY MOUTH EVERY DAY 10/27/21   Sravanthi Oliveros DO   gabapentin (NEURONTIN) 600 MG tablet Take 1 tablet by mouth 4 (Four) Times a Day. 4/4/22   Lisy Melendrez APRN   HYDROcodone-acetaminophen (NORCO) 5-325 MG per tablet Take 1 tablet by mouth Every 6 (Six) Hours As Needed (postop pain). 9/8/21   Ervin Alonzo MD       Objective     Vital Signs: /82 (BP Location: Left arm, Patient Position: Sitting, Cuff Size: Adult)   Pulse 80   Temp 97.1 °F (36.2 °C) (Temporal)   Ht 177.8 cm (70\")   Wt 91.7 kg (202 lb 3.2 " oz)   SpO2 97%   BMI 29.01 kg/m²   Physical Exam  Vitals and nursing note reviewed.   Constitutional:       General: He is not in acute distress.     Appearance: Normal appearance. He is overweight.   HENT:      Head: Normocephalic.   Cardiovascular:      Rate and Rhythm: Normal rate.      Pulses: Normal pulses.      Heart sounds: Normal heart sounds. No murmur heard.  Pulmonary:      Effort: Pulmonary effort is normal. No respiratory distress.      Breath sounds: Normal breath sounds. No wheezing.   Abdominal:      General: Bowel sounds are normal. There is no distension.      Palpations: Abdomen is soft.      Tenderness: There is abdominal tenderness (LUQ).   Musculoskeletal:         General: No swelling or tenderness. Normal range of motion.   Skin:     General: Skin is warm and dry.      Capillary Refill: Capillary refill takes less than 2 seconds.      Findings: No bruising, lesion or rash.   Neurological:      Mental Status: He is alert and oriented to person, place, and time. Mental status is at baseline.      Motor: No weakness.   Psychiatric:         Mood and Affect: Mood normal.         Behavior: Behavior normal.         Thought Content: Thought content normal.         Judgment: Judgment normal.       BMI is >= 25 and <30. (Overweight) The following options were offered after discussion;: nutrition counseling/recommendations    Results Reviewed:  Reviewed note from office visit in April 2022 with IRMA Carty.  Reviewed most recent CBC, CMP, hemoglobin A1c, alkaline phosphatase.  Also reviewed CT abdomen and pelvis with contrast from October 2021.    Assessment / Plan     Assessment/Plan:  Diagnoses and all orders for this visit:    1. LUQ abdominal pain (Primary)  -     Cancel: CBC & Differential  -     Cancel: Comprehensive Metabolic Panel  -     Cancel: C-reactive Protein; Future  -     Cancel: Sedimentation Rate  -     Cancel: CT Abdomen Pelvis With & Without Contrast  -     Cancel: Lipase  -      Cancel: Amylase  -     CT Abdomen Pelvis With Contrast; Future  -     C-reactive Protein; Future  -     CBC & Differential; Future  -     Comprehensive Metabolic Panel; Future  -     Amylase; Future  -     Lipase; Future  -     Sedimentation Rate; Future    2. LLQ abdominal pain  -     CT Abdomen Pelvis With Contrast; Future    3. Nausea  -     ondansetron (Zofran) 4 MG tablet; Take 1 tablet by mouth Every 8 (Eight) Hours As Needed for Nausea or Vomiting.  Dispense: 20 tablet; Refill: 0       Patient presents with complaints of left-sided abdominal pain that has been present for approximately 1 year but has progressively gotten worse over the last 2-3 weeks.  Will obtain stat CBC, CMP, lipase, amylase, sed rate, and CRP.  We will also obtain STAT CT of the abdomen and pelvis with contrast.  Patient does have an allergy to contrast dye but reports that this is only to oral contrast. He has tolerated IV contrast.    He reports that his abdominal pain is associated with nausea.  Recommend taking Zofran every 8 hours as needed for nausea.    Will be in touch with patient after imaging to determine follow up needs.    Informed the patient that he will need to seek treatment at the emergency department if his abdominal pain worsens or he develops other concerning symptoms such as vomiting, diarrhea, chills.    I have discussed the patient results/orders and and plan/recommendation with them at today's visit.      Sarah Alonzo, APRN   01/24/2023

## 2023-01-26 LAB
ALBUMIN SERPL-MCNC: NORMAL G/DL
ALP SERPL-CCNC: NORMAL U/L
ALT SERPL-CCNC: NORMAL U/L
AMYLASE SERPL-CCNC: NORMAL U/L
AST SERPL-CCNC: NORMAL U/L
BASOPHILS # BLD AUTO: NORMAL 10*3/UL
BILIRUB SERPL-MCNC: NORMAL MG/DL
BUN SERPL-MCNC: NORMAL MG/DL
CALCIUM SERPL-MCNC: NORMAL MG/DL
CHLORIDE SERPL-SCNC: NORMAL MMOL/L
CO2 SERPL-SCNC: NORMAL MMOL/L
CREAT SERPL-MCNC: NORMAL MG/DL
EOSINOPHIL # BLD AUTO: NORMAL 10*3/UL
EOSINOPHIL NFR BLD AUTO: NORMAL %
ERYTHROCYTE [SEDIMENTATION RATE] IN BLOOD BY WESTERGREN METHOD: NORMAL MM/HR
GLUCOSE SERPL-MCNC: NORMAL MG/DL
HCT VFR BLD AUTO: NORMAL %
HGB BLD-MCNC: NORMAL G/DL
LIPASE SERPL-CCNC: NORMAL U/L
LYMPHOCYTES # BLD AUTO: NORMAL 10*3/UL
LYMPHOCYTES NFR BLD AUTO: NORMAL %
MONOCYTES NFR BLD AUTO: NORMAL %
NEUTROPHILS NFR BLD AUTO: NORMAL %
PLATELET # BLD AUTO: NORMAL 10*3/UL
POTASSIUM SERPL-SCNC: NORMAL MMOL/L
PROT SERPL-MCNC: NORMAL G/DL
RBC # BLD AUTO: NORMAL 10*6/UL
REQUEST PROBLEM: NORMAL
SODIUM SERPL-SCNC: NORMAL MMOL/L
SPECIMEN STATUS: NORMAL
WBC # BLD AUTO: NORMAL X10E3/UL

## 2023-02-07 ENCOUNTER — HOSPITAL ENCOUNTER (OUTPATIENT)
Dept: CT IMAGING | Facility: HOSPITAL | Age: 63
Discharge: HOME | End: 2023-02-07
Payer: MEDICARE

## 2023-02-07 ENCOUNTER — TELEPHONE (OUTPATIENT)
Dept: INTERNAL MEDICINE | Facility: CLINIC | Age: 63
End: 2023-02-07
Payer: MEDICARE

## 2023-02-07 DIAGNOSIS — R10.12 LUQ ABDOMINAL PAIN: ICD-10-CM

## 2023-02-07 DIAGNOSIS — R10.32 LLQ ABDOMINAL PAIN: ICD-10-CM

## 2023-02-07 DIAGNOSIS — R10.12 LUQ ABDOMINAL PAIN: Primary | ICD-10-CM

## 2023-02-07 LAB — CREAT BLDA-MCNC: 1.3 MG/DL (ref 0.6–1.3)

## 2023-02-07 PROCEDURE — 82565 ASSAY OF CREATININE: CPT

## 2023-02-07 PROCEDURE — 74176 CT ABD & PELVIS W/O CONTRAST: CPT

## 2023-02-07 NOTE — TELEPHONE ENCOUNTER
Rob Lucero called stating pt there for CT scan w/contrast but he is allergic to contrast.  She is asking if CT can be done without contrast.  Discussed with Dr. Arias, as Sarah Alonzo was in with a patient and he says to do the CT without contrast.  Will place new order and scan will be done today.

## 2023-02-08 DIAGNOSIS — R10.32 LLQ ABDOMINAL PAIN: Primary | ICD-10-CM

## 2023-02-08 DIAGNOSIS — R11.0 NAUSEA: ICD-10-CM

## 2023-02-08 RX ORDER — ONDANSETRON 4 MG/1
4 TABLET, FILM COATED ORAL EVERY 8 HOURS PRN
Qty: 20 TABLET | Refills: 1 | Status: SHIPPED | OUTPATIENT
Start: 2023-02-08

## 2025-07-18 ENCOUNTER — TELEPHONE (OUTPATIENT)
Dept: UROLOGY | Age: 65
End: 2025-07-18

## 2025-07-18 NOTE — TELEPHONE ENCOUNTER
Patient wife called and needs rescheduled patient New Patient appointment. Please called [phone].      Thank you

## 2025-07-21 NOTE — TELEPHONE ENCOUNTER
attempted to reach patient to reschedule - no answer, left VM with next available appointment date/time. Asked patient to return call to confirm.

## 2025-08-07 ENCOUNTER — TELEPHONE (OUTPATIENT)
Dept: UROLOGY | Age: 65
End: 2025-08-07

## 2025-08-28 ENCOUNTER — TRANSCRIBE ORDERS (OUTPATIENT)
Dept: ADMINISTRATIVE | Age: 65
End: 2025-08-28

## 2025-08-28 DIAGNOSIS — R10.84 GENERALIZED ABDOMINAL PAIN: Primary | ICD-10-CM

## (undated) DEVICE — PK TURNOVER CYSTO RM

## (undated) DEVICE — SYRINGE,PISTON,IRRIGATION,60ML,STERILE: Brand: MEDLINE

## (undated) DEVICE — DOVER HYDROGEL COATED LATEX FOLEY CATHETER, 30 ML, 3-WAY 24 FR/CH (8.0 MM): Brand: DOVER

## (undated) DEVICE — EVAC BLDR UROVAC W ADAPT

## (undated) DEVICE — ST FLD IRR WARM

## (undated) DEVICE — PK CYSTO 30

## (undated) DEVICE — GLV SURG BIOGEL M LTX PF 7 1/2

## (undated) DEVICE — DRAINBAG,ANTI-REFLUX TOWER,L/F,2000ML,LL: Brand: MEDLINE

## (undated) DEVICE — DRSNG GZ PETROLTM CURAD 3X9IN STRL

## (undated) DEVICE — ELECTRD SUPERSECT FRNT LOAD 5PK

## (undated) DEVICE — BUTN RESECT IGLESIUS 5PK 1P/U